# Patient Record
Sex: MALE | Race: WHITE | NOT HISPANIC OR LATINO
[De-identification: names, ages, dates, MRNs, and addresses within clinical notes are randomized per-mention and may not be internally consistent; named-entity substitution may affect disease eponyms.]

---

## 2021-01-11 PROBLEM — Z00.00 ENCOUNTER FOR PREVENTIVE HEALTH EXAMINATION: Status: ACTIVE | Noted: 2021-01-11

## 2021-01-25 ENCOUNTER — APPOINTMENT (OUTPATIENT)
Dept: NEUROLOGY | Facility: CLINIC | Age: 61
End: 2021-01-25
Payer: COMMERCIAL

## 2021-01-25 ENCOUNTER — INPATIENT (INPATIENT)
Facility: HOSPITAL | Age: 61
LOS: 2 days | Discharge: HOME | DRG: 101 | End: 2021-01-28
Attending: INTERNAL MEDICINE | Admitting: INTERNAL MEDICINE
Payer: COMMERCIAL

## 2021-01-25 VITALS
HEIGHT: 74 IN | SYSTOLIC BLOOD PRESSURE: 121 MMHG | TEMPERATURE: 100 F | RESPIRATION RATE: 20 BRPM | WEIGHT: 250 LBS | DIASTOLIC BLOOD PRESSURE: 72 MMHG | OXYGEN SATURATION: 97 % | HEART RATE: 138 BPM

## 2021-01-25 VITALS
TEMPERATURE: 96.8 F | DIASTOLIC BLOOD PRESSURE: 75 MMHG | HEART RATE: 137 BPM | BODY MASS INDEX: 32.08 KG/M2 | OXYGEN SATURATION: 98 % | WEIGHT: 250 LBS | SYSTOLIC BLOOD PRESSURE: 121 MMHG | HEIGHT: 74 IN

## 2021-01-25 VITALS
BODY MASS INDEX: 32.08 KG/M2 | TEMPERATURE: 97.3 F | WEIGHT: 250 LBS | DIASTOLIC BLOOD PRESSURE: 73 MMHG | RESPIRATION RATE: 20 BRPM | HEIGHT: 74 IN | SYSTOLIC BLOOD PRESSURE: 119 MMHG | HEART RATE: 138 BPM

## 2021-01-25 DIAGNOSIS — R56.9 UNSPECIFIED CONVULSIONS: ICD-10-CM

## 2021-01-25 DIAGNOSIS — G35 MULTIPLE SCLEROSIS: ICD-10-CM

## 2021-01-25 DIAGNOSIS — Z29.9 ENCOUNTER FOR PROPHYLACTIC MEASURES, UNSPECIFIED: ICD-10-CM

## 2021-01-25 DIAGNOSIS — R40.20 UNSPECIFIED COMA: ICD-10-CM

## 2021-01-25 DIAGNOSIS — Z86.39 PERSONAL HISTORY OF OTHER ENDOCRINE, NUTRITIONAL AND METABOLIC DISEASE: ICD-10-CM

## 2021-01-25 DIAGNOSIS — N17.9 ACUTE KIDNEY FAILURE, UNSPECIFIED: ICD-10-CM

## 2021-01-25 DIAGNOSIS — R65.10 SYSTEMIC INFLAMMATORY RESPONSE SYNDROME (SIRS) OF NON-INFECTIOUS ORIGIN WITHOUT ACUTE ORGAN DYSFUNCTION: ICD-10-CM

## 2021-01-25 DIAGNOSIS — R00.0 TACHYCARDIA, UNSPECIFIED: ICD-10-CM

## 2021-01-25 DIAGNOSIS — E87.6 HYPOKALEMIA: ICD-10-CM

## 2021-01-25 LAB
ALBUMIN SERPL ELPH-MCNC: 2.7 G/DL — LOW (ref 3.5–5)
ALBUMIN SERPL ELPH-MCNC: 3 G/DL — LOW (ref 3.5–5)
ALP SERPL-CCNC: 100 U/L — SIGNIFICANT CHANGE UP (ref 40–120)
ALP SERPL-CCNC: 119 U/L — SIGNIFICANT CHANGE UP (ref 40–120)
ALT FLD-CCNC: 19 U/L DA — SIGNIFICANT CHANGE UP (ref 10–60)
ALT FLD-CCNC: 22 U/L DA — SIGNIFICANT CHANGE UP (ref 10–60)
AMPHET UR-MCNC: NEGATIVE — SIGNIFICANT CHANGE UP
ANION GAP SERPL CALC-SCNC: 14 MMOL/L — SIGNIFICANT CHANGE UP (ref 5–17)
ANION GAP SERPL CALC-SCNC: 9 MMOL/L — SIGNIFICANT CHANGE UP (ref 5–17)
APTT BLD: 30.4 SEC — SIGNIFICANT CHANGE UP (ref 27.5–35.5)
AST SERPL-CCNC: 25 U/L — SIGNIFICANT CHANGE UP (ref 10–40)
AST SERPL-CCNC: 28 U/L — SIGNIFICANT CHANGE UP (ref 10–40)
BARBITURATES UR SCN-MCNC: NEGATIVE — SIGNIFICANT CHANGE UP
BASOPHILS # BLD AUTO: 0.06 K/UL — SIGNIFICANT CHANGE UP (ref 0–0.2)
BASOPHILS NFR BLD AUTO: 0.5 % — SIGNIFICANT CHANGE UP (ref 0–2)
BENZODIAZ UR-MCNC: POSITIVE
BILIRUB SERPL-MCNC: 1.6 MG/DL — HIGH (ref 0.2–1.2)
BILIRUB SERPL-MCNC: 1.7 MG/DL — HIGH (ref 0.2–1.2)
BUN SERPL-MCNC: 13 MG/DL — SIGNIFICANT CHANGE UP (ref 7–18)
BUN SERPL-MCNC: 14 MG/DL — SIGNIFICANT CHANGE UP (ref 7–18)
CALCIUM SERPL-MCNC: 6.7 MG/DL — LOW (ref 8.4–10.5)
CALCIUM SERPL-MCNC: 6.9 MG/DL — LOW (ref 8.4–10.5)
CHLORIDE SERPL-SCNC: 101 MMOL/L — SIGNIFICANT CHANGE UP (ref 96–108)
CHLORIDE SERPL-SCNC: 96 MMOL/L — SIGNIFICANT CHANGE UP (ref 96–108)
CHLORIDE UR-SCNC: 40 MMOL/L — SIGNIFICANT CHANGE UP
CHOLEST SERPL-MCNC: 111 MG/DL — SIGNIFICANT CHANGE UP
CO2 SERPL-SCNC: 26 MMOL/L — SIGNIFICANT CHANGE UP (ref 22–31)
CO2 SERPL-SCNC: 29 MMOL/L — SIGNIFICANT CHANGE UP (ref 22–31)
COCAINE METAB.OTHER UR-MCNC: NEGATIVE — SIGNIFICANT CHANGE UP
CREAT ?TM UR-MCNC: 412 MG/DL — SIGNIFICANT CHANGE UP
CREAT SERPL-MCNC: 1.53 MG/DL — HIGH (ref 0.5–1.3)
CREAT SERPL-MCNC: 1.67 MG/DL — HIGH (ref 0.5–1.3)
D DIMER BLD IA.RAPID-MCNC: 807 NG/ML DDU — HIGH
EOSINOPHIL # BLD AUTO: 0.04 K/UL — SIGNIFICANT CHANGE UP (ref 0–0.5)
EOSINOPHIL NFR BLD AUTO: 0.3 % — SIGNIFICANT CHANGE UP (ref 0–6)
GLUCOSE SERPL-MCNC: 112 MG/DL — HIGH (ref 70–99)
GLUCOSE SERPL-MCNC: 169 MG/DL — HIGH (ref 70–99)
HCT VFR BLD CALC: 35.7 % — LOW (ref 39–50)
HDLC SERPL-MCNC: 77 MG/DL — SIGNIFICANT CHANGE UP
HGB BLD-MCNC: 11.6 G/DL — LOW (ref 13–17)
IMM GRANULOCYTES NFR BLD AUTO: 0.6 % — SIGNIFICANT CHANGE UP (ref 0–1.5)
INR BLD: 1.21 RATIO — HIGH (ref 0.88–1.16)
IRON SATN MFR SERPL: 15 % — LOW (ref 20–55)
IRON SATN MFR SERPL: 29 UG/DL — LOW (ref 65–170)
LACTATE SERPL-SCNC: 2.9 MMOL/L — HIGH (ref 0.7–2)
LACTATE SERPL-SCNC: 3 MMOL/L — HIGH (ref 0.7–2)
LACTATE SERPL-SCNC: 3.5 MMOL/L — HIGH (ref 0.7–2)
LDH SERPL L TO P-CCNC: 165 U/L — SIGNIFICANT CHANGE UP (ref 120–225)
LIPID PNL WITH DIRECT LDL SERPL: 22 MG/DL — SIGNIFICANT CHANGE UP
LYMPHOCYTES # BLD AUTO: 1.05 K/UL — SIGNIFICANT CHANGE UP (ref 1–3.3)
LYMPHOCYTES # BLD AUTO: 8 % — LOW (ref 13–44)
MAGNESIUM SERPL-MCNC: 0.5 MG/DL — CRITICAL LOW (ref 1.6–2.6)
MAGNESIUM SERPL-MCNC: 0.5 MG/DL — CRITICAL LOW (ref 1.6–2.6)
MAGNESIUM SERPL-MCNC: 1.8 MG/DL — SIGNIFICANT CHANGE UP (ref 1.6–2.6)
MCHC RBC-ENTMCNC: 29.6 PG — SIGNIFICANT CHANGE UP (ref 27–34)
MCHC RBC-ENTMCNC: 32.5 GM/DL — SIGNIFICANT CHANGE UP (ref 32–36)
MCV RBC AUTO: 91.1 FL — SIGNIFICANT CHANGE UP (ref 80–100)
METHADONE UR-MCNC: NEGATIVE — SIGNIFICANT CHANGE UP
MONOCYTES # BLD AUTO: 0.48 K/UL — SIGNIFICANT CHANGE UP (ref 0–0.9)
MONOCYTES NFR BLD AUTO: 3.7 % — SIGNIFICANT CHANGE UP (ref 2–14)
NEUTROPHILS # BLD AUTO: 11.37 K/UL — HIGH (ref 1.8–7.4)
NEUTROPHILS NFR BLD AUTO: 86.9 % — HIGH (ref 43–77)
NON HDL CHOLESTEROL: 34 MG/DL — SIGNIFICANT CHANGE UP
NRBC # BLD: 0 /100 WBCS — SIGNIFICANT CHANGE UP (ref 0–0)
NT-PROBNP SERPL-SCNC: 80 PG/ML — SIGNIFICANT CHANGE UP (ref 0–125)
OPIATES UR-MCNC: NEGATIVE — SIGNIFICANT CHANGE UP
OSMOLALITY UR: 474 MOS/KG — SIGNIFICANT CHANGE UP (ref 50–1200)
PCP SPEC-MCNC: SIGNIFICANT CHANGE UP
PCP UR-MCNC: NEGATIVE — SIGNIFICANT CHANGE UP
PHOSPHATE SERPL-MCNC: 2.1 MG/DL — LOW (ref 2.5–4.5)
PHOSPHATE SERPL-MCNC: 2.1 MG/DL — LOW (ref 2.5–4.5)
PLATELET # BLD AUTO: 102 K/UL — LOW (ref 150–400)
POTASSIUM SERPL-MCNC: 3.1 MMOL/L — LOW (ref 3.5–5.3)
POTASSIUM SERPL-MCNC: 3.3 MMOL/L — LOW (ref 3.5–5.3)
POTASSIUM SERPL-SCNC: 3.1 MMOL/L — LOW (ref 3.5–5.3)
POTASSIUM SERPL-SCNC: 3.3 MMOL/L — LOW (ref 3.5–5.3)
PROT SERPL-MCNC: 6.1 G/DL — SIGNIFICANT CHANGE UP (ref 6–8.3)
PROT SERPL-MCNC: 6.9 G/DL — SIGNIFICANT CHANGE UP (ref 6–8.3)
PROTHROM AB SERPL-ACNC: 14.3 SEC — HIGH (ref 10.6–13.6)
RBC # BLD: 3.92 M/UL — LOW (ref 4.2–5.8)
RBC # FLD: 17.6 % — HIGH (ref 10.3–14.5)
SARS-COV-2 RNA SPEC QL NAA+PROBE: SIGNIFICANT CHANGE UP
SODIUM SERPL-SCNC: 136 MMOL/L — SIGNIFICANT CHANGE UP (ref 135–145)
SODIUM SERPL-SCNC: 139 MMOL/L — SIGNIFICANT CHANGE UP (ref 135–145)
SODIUM UR-SCNC: 33 MMOL/L — SIGNIFICANT CHANGE UP
T4 AB SER-ACNC: 9.7 UG/DL — SIGNIFICANT CHANGE UP (ref 4.6–12)
THC UR QL: NEGATIVE — SIGNIFICANT CHANGE UP
TIBC SERPL-MCNC: 199 UG/DL — LOW (ref 250–450)
TRIGL SERPL-MCNC: 59 MG/DL — SIGNIFICANT CHANGE UP
TROPONIN I SERPL-MCNC: <0.015 NG/ML — SIGNIFICANT CHANGE UP (ref 0–0.04)
TSH SERPL-MCNC: 2.92 UU/ML — SIGNIFICANT CHANGE UP (ref 0.34–4.82)
UIBC SERPL-MCNC: 170 UG/DL — SIGNIFICANT CHANGE UP (ref 110–370)
URATE SERPL-MCNC: 9 MG/DL — HIGH (ref 3.4–8.8)
WBC # BLD: 13.08 K/UL — HIGH (ref 3.8–10.5)
WBC # FLD AUTO: 13.08 K/UL — HIGH (ref 3.8–10.5)

## 2021-01-25 PROCEDURE — 82962 GLUCOSE BLOOD TEST: CPT | Mod: NC

## 2021-01-25 PROCEDURE — 71045 X-RAY EXAM CHEST 1 VIEW: CPT | Mod: 26

## 2021-01-25 PROCEDURE — 99285 EMERGENCY DEPT VISIT HI MDM: CPT

## 2021-01-25 PROCEDURE — 99072 ADDL SUPL MATRL&STAF TM PHE: CPT

## 2021-01-25 PROCEDURE — 99205 OFFICE O/P NEW HI 60 MIN: CPT

## 2021-01-25 PROCEDURE — 70486 CT MAXILLOFACIAL W/O DYE: CPT | Mod: 26

## 2021-01-25 PROCEDURE — 70450 CT HEAD/BRAIN W/O DYE: CPT | Mod: 26

## 2021-01-25 RX ORDER — SODIUM CHLORIDE 9 MG/ML
1000 INJECTION INTRAMUSCULAR; INTRAVENOUS; SUBCUTANEOUS
Refills: 0 | Status: DISCONTINUED | OUTPATIENT
Start: 2021-01-25 | End: 2021-01-28

## 2021-01-25 RX ORDER — MAGNESIUM SULFATE 500 MG/ML
2 VIAL (ML) INJECTION
Refills: 0 | Status: COMPLETED | OUTPATIENT
Start: 2021-01-25 | End: 2021-01-26

## 2021-01-25 RX ORDER — ACETAMINOPHEN 500 MG
650 TABLET ORAL EVERY 6 HOURS
Refills: 0 | Status: DISCONTINUED | OUTPATIENT
Start: 2021-01-25 | End: 2021-01-28

## 2021-01-25 RX ORDER — POTASSIUM PHOSPHATE, MONOBASIC POTASSIUM PHOSPHATE, DIBASIC 236; 224 MG/ML; MG/ML
30 INJECTION, SOLUTION INTRAVENOUS ONCE
Refills: 0 | Status: COMPLETED | OUTPATIENT
Start: 2021-01-25 | End: 2021-01-25

## 2021-01-25 RX ORDER — CALCIUM GLUCONATE 100 MG/ML
1 VIAL (ML) INTRAVENOUS ONCE
Refills: 0 | Status: COMPLETED | OUTPATIENT
Start: 2021-01-25 | End: 2021-01-25

## 2021-01-25 RX ORDER — SODIUM CHLORIDE 9 MG/ML
1000 INJECTION, SOLUTION INTRAVENOUS ONCE
Refills: 0 | Status: COMPLETED | OUTPATIENT
Start: 2021-01-25 | End: 2021-01-25

## 2021-01-25 RX ORDER — SIMVASTATIN 10 MG/1
10 TABLET, FILM COATED ORAL
Refills: 0 | Status: ACTIVE | COMMUNITY

## 2021-01-25 RX ORDER — SODIUM,POTASSIUM PHOSPHATES 278-250MG
1 POWDER IN PACKET (EA) ORAL ONCE
Refills: 0 | Status: COMPLETED | OUTPATIENT
Start: 2021-01-25 | End: 2021-01-25

## 2021-01-25 RX ORDER — CEFEPIME 1 G/1
2000 INJECTION, POWDER, FOR SOLUTION INTRAMUSCULAR; INTRAVENOUS EVERY 8 HOURS
Refills: 0 | Status: DISCONTINUED | OUTPATIENT
Start: 2021-01-25 | End: 2021-01-27

## 2021-01-25 RX ORDER — HEPARIN SODIUM 5000 [USP'U]/ML
5000 INJECTION INTRAVENOUS; SUBCUTANEOUS EVERY 12 HOURS
Refills: 0 | Status: DISCONTINUED | OUTPATIENT
Start: 2021-01-25 | End: 2021-01-28

## 2021-01-25 RX ORDER — LISINOPRIL 2.5 MG/1
2.5 TABLET ORAL
Refills: 0 | Status: ACTIVE | COMMUNITY

## 2021-01-25 RX ORDER — FENTANYL CITRATE 50 UG/ML
50 INJECTION INTRAVENOUS ONCE
Refills: 0 | Status: DISCONTINUED | OUTPATIENT
Start: 2021-01-25 | End: 2021-01-25

## 2021-01-25 RX ORDER — MAGNESIUM SULFATE 500 MG/ML
2 VIAL (ML) INJECTION ONCE
Refills: 0 | Status: COMPLETED | OUTPATIENT
Start: 2021-01-25 | End: 2021-01-25

## 2021-01-25 RX ORDER — ASPIRIN/CALCIUM CARB/MAGNESIUM 324 MG
162 TABLET ORAL DAILY
Refills: 0 | Status: DISCONTINUED | OUTPATIENT
Start: 2021-01-25 | End: 2021-01-28

## 2021-01-25 RX ORDER — HYDROMORPHONE HYDROCHLORIDE 2 MG/ML
0.5 INJECTION INTRAMUSCULAR; INTRAVENOUS; SUBCUTANEOUS EVERY 6 HOURS
Refills: 0 | Status: DISCONTINUED | OUTPATIENT
Start: 2021-01-25 | End: 2021-01-28

## 2021-01-25 RX ORDER — POTASSIUM CHLORIDE 20 MEQ
40 PACKET (EA) ORAL ONCE
Refills: 0 | Status: COMPLETED | OUTPATIENT
Start: 2021-01-25 | End: 2021-01-25

## 2021-01-25 RX ORDER — PIPERACILLIN AND TAZOBACTAM 4; .5 G/20ML; G/20ML
3.38 INJECTION, POWDER, LYOPHILIZED, FOR SOLUTION INTRAVENOUS ONCE
Refills: 0 | Status: COMPLETED | OUTPATIENT
Start: 2021-01-25 | End: 2021-01-25

## 2021-01-25 RX ADMIN — Medication 100 GRAM(S): at 20:09

## 2021-01-25 RX ADMIN — SODIUM CHLORIDE 1000 MILLILITER(S): 9 INJECTION, SOLUTION INTRAVENOUS at 13:30

## 2021-01-25 RX ADMIN — PIPERACILLIN AND TAZOBACTAM 200 GRAM(S): 4; .5 INJECTION, POWDER, LYOPHILIZED, FOR SOLUTION INTRAVENOUS at 14:07

## 2021-01-25 RX ADMIN — PIPERACILLIN AND TAZOBACTAM 3.38 GRAM(S): 4; .5 INJECTION, POWDER, LYOPHILIZED, FOR SOLUTION INTRAVENOUS at 14:37

## 2021-01-25 RX ADMIN — Medication 162 MILLIGRAM(S): at 13:51

## 2021-01-25 RX ADMIN — SODIUM CHLORIDE 1000 MILLILITER(S): 9 INJECTION, SOLUTION INTRAVENOUS at 14:58

## 2021-01-25 RX ADMIN — POTASSIUM PHOSPHATE, MONOBASIC POTASSIUM PHOSPHATE, DIBASIC 83.33 MILLIMOLE(S): 236; 224 INJECTION, SOLUTION INTRAVENOUS at 22:18

## 2021-01-25 RX ADMIN — Medication 1 TABLET(S): at 22:17

## 2021-01-25 RX ADMIN — SODIUM CHLORIDE 3000 MILLILITER(S): 9 INJECTION, SOLUTION INTRAVENOUS at 13:58

## 2021-01-25 RX ADMIN — SODIUM CHLORIDE 80 MILLILITER(S): 9 INJECTION INTRAMUSCULAR; INTRAVENOUS; SUBCUTANEOUS at 22:37

## 2021-01-25 RX ADMIN — HEPARIN SODIUM 5000 UNIT(S): 5000 INJECTION INTRAVENOUS; SUBCUTANEOUS at 22:36

## 2021-01-25 RX ADMIN — Medication 100 GRAM(S): at 18:38

## 2021-01-25 RX ADMIN — Medication 40 MILLIEQUIVALENT(S): at 18:37

## 2021-01-25 RX ADMIN — Medication 50 GRAM(S): at 22:17

## 2021-01-25 RX ADMIN — FENTANYL CITRATE 50 MICROGRAM(S): 50 INJECTION INTRAVENOUS at 13:57

## 2021-01-25 RX ADMIN — SODIUM CHLORIDE 1000 MILLILITER(S): 9 INJECTION, SOLUTION INTRAVENOUS at 14:30

## 2021-01-25 NOTE — PHYSICAL EXAM
[General Appearance - Alert] : alert [General Appearance - In No Acute Distress] : in no acute distress [Person] : oriented to person [Place] : oriented to place [Time] : oriented to time [Registration Intact] : recent registration memory intact [Concentration Intact] : normal concentrating ability [Visual Intact] : visual attention was ~T not ~L decreased [Naming Objects] : no difficulty naming common objects [Repeating Phrases] : no difficulty repeating a phrase [Fluency] : fluency intact [Comprehension] : comprehension intact [Vocabulary] : adequate range of vocabulary [Cranial Nerves Optic (II)] : visual acuity intact bilaterally,  visual fields full to confrontation, pupils equal round and reactive to light [Cranial Nerves Oculomotor (III)] : extraocular motion intact [Cranial Nerves Trigeminal (V)] : facial sensation intact symmetrically [Cranial Nerves Facial (VII)] : face symmetrical [Cranial Nerves Vestibulocochlear (VIII)] : hearing was intact bilaterally [Cranial Nerves Glossopharyngeal (IX)] : tongue and palate midline [Cranial Nerves Accessory (XI - Cranial And Spinal)] : head turning and shoulder shrug symmetric [Cranial Nerves Hypoglossal (XII)] : there was no tongue deviation with protrusion [Motor Tone] : muscle tone was normal in all four extremities [Motor Strength] : muscle strength was normal in all four extremities [Involuntary Movements] : no involuntary movements were seen [Sensation Tactile Decrease] : light touch was intact [Abnormal Walk] : normal gait [Balance] : balance was intact [1+] : Ankle jerk left 1+ [Full Pulse] : the pedal pulses are present [Coordination - Dysmetria Impaired Finger-to-Nose Bilateral] : not present [Coordination - Dysmetria Impaired Heel-to-Shin Bilateral] : not present [Plantar Reflex Right Only] : normal on the right [Plantar Reflex Left Only] : normal on the left [FreeTextEntry5] : right exotropia, fundi not visualized

## 2021-01-25 NOTE — H&P ADULT - PROBLEM SELECTOR PLAN 5
Pt p/w wbc 13, hr 138, rr 20, lactate 3.5, no apparent source meets 3/4 criterion of sirs  covid neg  s/p zosyn in ed  will give prophylactic antibioitcs till bcx and ucx are back   ceftriaxone and azithro  f/u procal

## 2021-01-25 NOTE — H&P ADULT - PROBLEM SELECTOR PLAN 7
RISK                                                          Points  [] Previous VTE                                           3  [] Thrombophilia                                        2  [] Lower limb paralysis                              2   [] Current Cancer                                       2   [x] Immobilization > 24 hrs                        1  [] ICU/CCU stay > 24 hours                       1  [x] Age > 60                                                   1    sc lovenox RISK                                                          Points  [] Previous VTE                                           3  [] Thrombophilia                                        2  [] Lower limb paralysis                              2   [] Current Cancer                                       2   [x] Immobilization > 24 hrs                        1  [] ICU/CCU stay > 24 hours                       1  [x] Age > 60                                                   1    sc heparin pt last drink last night 1/24  usually drinks 3-4 bear per day   will start ciwa protocol  thiamin iv  ativan prn

## 2021-01-25 NOTE — ED ADULT TRIAGE NOTE - CHIEF COMPLAINT QUOTE
PT SENT BY PMD FOR LOC, FOLLOWED BY GENERALIZED SHAKING AND INC PT SENT BY PMD FOR LOC, FOLLOWED BY GENERALIZED SHAKING AND INCONTINENCE

## 2021-01-25 NOTE — H&P ADULT - HISTORY OF PRESENT ILLNESS
60 year old male with PMHx of multiple sclerosis and no significant PSHx presents to the ED with complaints of severe post nasal drip over the past few days. Patient reports that his symptoms are caused by a decrease in po intake, which he states has caused him to become very dehydrated. Patient endorses that he has been taking Benadryl 24 grams q4h at home in order to attempt to dry up the post nasal drip. Patient additionally complains of some mild dizziness. Patient reports that he has not had an MRI done in the past twenty-five years related to his MS and that he is not currently on medication for his condition, thus prompting [facility]. Patient endorses that just prior to arrival today he experienced loss of consciousness while in the examination room of his doctor's examination room. Patient states that during the episode he was generally shaking, with his eyes open and rolling back with a right-sided gaze deviation. Patient reports that the entire episode lasted for approximately two to three minutes, after which he returned to his baseline spontaneously with some postictal confusion, per Dr. Rizo. Patient was then escorted to the ED via EMS immediately. In the ED, patient denies any associated chest pain, shortness of breath, sweatiness, vomiting, and any other symptoms 60 year old male from home with PMHx of multiple sclerosis and no significant PSHx presents to the ED  after experiencing an episode of seizure at his neurologist office. Patient reports that he has not had an MRI done in the past twenty-five years related to his MS and that he is not currently on medication for his condition, thus prompting him to go see his neurologist at [place] Dr. Rizo. Patient endorses that just prior to arrival today he experienced loss of consciousness while in the examination room of his doctor's examination room. Patient states that during the episode he was generally shaking, with his eyes open and rolling back with a right-sided gaze deviation. Patient reports that the entire episode lasted for approximately two to three minutes, after which he returned to his baseline spontaneously with some postictal confusion as per dr office. Patient was then escorted to the ED via EMS immediately. In the ED, patient denies any associated chest pain, shortness of breath, sweatiness, vomiting, and any other symptoms. Pt also has complaints of severe post nasal drip over the past few days. Patient reports that his symptoms are caused by a decrease in po intake, which he states has caused him to become very dehydrated. Patient endorses that he has been taking Benadryl 24 grams q4h at home in order to attempt to dry up the post nasal drip. Patient additionally complains of some mild dizziness.  60 year old male from home with PMHx of multiple sclerosis and no significant PSHx presents to the ED  after experiencing an episode of seizure at his neurologist office. Patient reports that he has not had an MRI done in the past twenty-five years related to his MS and that he is not currently on medication for his condition, thus prompting him to go see his neurologist at [place] Dr. Rizo. Patient endorses that just prior to arrival today he experienced loss of consciousness while in the examination room of his doctor's examination room. Patient states that during the episode he was generally shaking, with his eyes open and rolling back with a right-sided gaze deviation. Patient reports that the entire episode lasted for approximately two to three minutes, after which he returned to his baseline spontaneously with some postictal confusion as per dr office. Patient was then escorted to the ED via EMS immediately. In the ED, patient denies any associated chest pain, shortness of breath, sweatiness, vomiting, and any other symptoms. Pt also has complaints of severe post nasal drip over the past few days. Patient reports that his symptoms are caused by a decrease in po intake, which he states has caused him to become very dehydrated. Patient endorses that he has been taking Benadryl 24 grams q4h at home in order to attempt to dry up the post nasal drip. Patient additionally complains of some mild dizziness. Pt endorsed diarrhoea for last 3 days and has been coughing up and vomiting mucous that is dripping from back of his throat.

## 2021-01-25 NOTE — ED PROVIDER NOTE - PROGRESS NOTE DETAILS
Patient developing back pain. Given EKG abnormalities from prior, this is concerning for a posterior MI. Will repeat EKG, give fluid bolus, and reassess. May need to transfer to cardiology. ekg, sxs improving. arturo ferrer - does want eeg/mr / wants pt to be seen by neuro here. will admit for further care.

## 2021-01-25 NOTE — HISTORY OF PRESENT ILLNESS
[FreeTextEntry1] : The patient is here for dizziness but prior to being seen the patient has an episode in  the exam room of LOC noted  by the wife followed by generalized tonic clonic seizure, followed with eye open and rolled back and right gaze deviation.  The event lasted for about 2-3 minutes and the patient had post ictal confusion with some aggression.  He recovered to baseline.  There was no tongue bite, foaming, bowel or  bladder incontinence. The patient had not been eating and has been vomiting at home, FS was in the 150;s in the office.  BP and temperature were normal but the patient's pulse was elevated to the 130's.

## 2021-01-25 NOTE — ED PROVIDER NOTE - OBJECTIVE STATEMENT
60 year old male with PMHx of multiple sclerosis and no significant PSHx presents to the ED with complaints of severe post nasal drip over the past few days. Patient reports that his symptoms are caused by a decrease in po intake, which he states has caused him to become very dehydrated. Patient endorses that he has been taking Benadryl 24 grams q4h at home in order to attempt to dry up the post nasal drip. Patient additionally complains of some mild dizziness. Patient reports that he has not had an MRI done in the past twenty-five years related to his MS and that he is not currently on medication for his condition, thus prompting him to [facility]. Patient endorses that just prior to arrival today he experienced loss of consciousness while in the examination room of his doctor's examination room. Patient states that during the episode he was generally shaking, with his eyes open and rolling back with a right-sided gaze deviation. Patient reports that the entire episode lasted for approximately two to three minutes, after which he returned to his baseline spontaneously with some postictal confusion, per Dr. Rizo. Patient was then escorted to the ED via EMS immediately. In the ED, patient denies any associated chest pain, shortness of breath, sweatiness, vomiting, and any other symptoms.   Allergies: Keflex (itchiness)

## 2021-01-25 NOTE — ED ADULT NURSE NOTE - NSIMPLEMENTINTERV_GEN_ALL_ED
Implemented All Fall with Harm Risk Interventions:  [place] to call system. Call bell, personal items and telephone within reach. Instruct patient to call for assistance. Room bathroom lighting operational. Non-slip footwear when patient is off stretcher. Physically safe environment: no spills, clutter or unnecessary equipment. Stretcher in lowest position, wheels locked, appropriate side rails in place. Provide visual cue, wrist band, yellow gown, etc. Monitor gait and stability. Monitor for mental status changes and reorient to person, place, and time. Review medications for side effects contributing to fall risk. Reinforce activity limits and safety measures with patient and family. Provide visual clues: red socks.

## 2021-01-25 NOTE — H&P ADULT - ASSESSMENT
60 year old male from home with PMHx of multiple sclerosis and no significant PSHx presents to the ED  after experiencing an episode of seizure at his neurologist office.   patient ADMITTED FOR workup of newonset seizure.

## 2021-01-25 NOTE — REVIEW OF SYSTEMS
[As Noted in HPI] : as noted in HPI [Fever] : no fever [Anxiety] : no anxiety [Eyesight Problems] : no eyesight problems [Loss Of Hearing] : no hearing loss [Chest Pain] : no chest pain [Cough] : no cough [Incontinence] : no incontinence [Vomiting] : no vomiting [Joint Pain] : no joint pain [Itching] : no itching [Muscle Weakness] : no muscle weakness [Easy Bleeding] : no tendency for easy bleeding

## 2021-01-25 NOTE — ED PROVIDER NOTE - CLINICAL SUMMARY MEDICAL DECISION MAKING FREE TEXT BOX
Seizure vs. syncope. Clinically symptoms are likely related to dehydration, however low grade temperature noted. Will obtain CT scan of the head and max/face to evaluate for an abscess related to an upper airway infection, as well as a CT head to evaluate for a bleed. Given that Dr. Rizo thought that patient had a seizure, I will discuss with her to see if patient should be admitted for a EEG, however I suspect that this is not likely as symptoms are likely related to dehydration. Will give fluid bolus and reassess.    EKG with concerning findings that may be rate related or ischemic.

## 2021-01-25 NOTE — H&P ADULT - PROBLEM SELECTOR PLAN 6
Pt has hx of MS   on no active treatment aggresively replaced   f/u mag serum levels  f/u urine levels'

## 2021-01-25 NOTE — H&P ADULT - PROBLEM SELECTOR PLAN 1
- p/w episode of witnessed seizure atdr office (Tonic clonic episode), with fall on the floor  - Followed by some post-ictal confusion   - No focal weakness, CN 2-12 intact  - pt has hx of MS  - Afebrile, mild WBC elevation, negative UA, and CT head unremarkable  - MILD electrolytes IMBALANCE and renal functions SHOW SOME ALICE  - Lactate 3.5  - f/u BAL , Salicylates and Tylenol  , Utox   - EKG NSR  - CT head : unremarkable CT study of the brain. No acute abnormality suggested.  - Aspiration precaution/Seizure precaution/Fall precautions   - f/u Orthostatics   - f/u Vitamin B12 & Folate  - f/u  ammonia level  s/p phentanyl in ed  - f/u PT  -f/u eeg  ** Neurology consulted Dr. MANNING - p/w episode of witnessed seizure atdr office (Tonic clonic episode), with fall on the floor  - Followed by some post-ictal confusion   - No focal weakness, CN 2-12 intact  - pt has hx of MS  - Afebrile, mild WBC elevation, negative UA, and CT head unremarkable  - MILD electrolytes IMBALANCE and renal functions SHOW SOME ALICE  - Lactate 3.5  - f/u BAL , Salicylates and Tylenol  , Utox   - EKG NSR  - CT head : unremarkable CT study of the brain. No acute abnormality suggested.  - Aspiration precaution/Seizure precaution/Fall precautions   - f/u Orthostatics   - f/u Vitamin B12 & Folate  - f/u  ammonia level  s/p phentanyl in ed  - f/u PT  -f/u eeg  -f/u ct angio for r/o pe  ** Neurology consulted Dr. MANNING

## 2021-01-25 NOTE — H&P ADULT - NSHPPHYSICALEXAM_GEN_ALL_CORE
PHYSICAL EXAM:  GENERAL: NAD, speaks in full sentences, no signs of respiratory distress  HEAD:  Atraumatic, Normocephalic  EYES: EOMI, PERRLA, conjunctiva and sclera clear  NECK: Supple, No JVD  CHEST/LUNG: Clear to auscultation bilaterally; No wheeze; No crackles; No accessory muscles used  HEART: Regular rate and rhythm; No murmurs;   ABDOMEN: Soft, Nontender, Nondistended; Bowel sounds present; No guarding  EXTREMITIES:  2+ Peripheral Pulses, No cyanosis or edema  PSYCH: AAOx3  NEUROLOGY: non-focal  SKIN: No rashes or lesions PHYSICAL EXAM:  GENERAL: NAD, speaks in full sentences, no signs of respiratory distress  HEAD:  Atraumatic, Normocephalic  EYES: EOMI, PERRLA, conjunctiva and sclera clear  NECK: Supple, No JVD  CHEST/LUNG: Clear to auscultation bilaterally; No wheeze; No crackles; No accessory muscles used  HEART: s1,s2 ; No murmurs;   ABDOMEN: Soft, Nontender, Nondistended; Bowel sounds present; No guarding  EXTREMITIES:  2+ Peripheral Pulses, No cyanosis or edema  PSYCH: AAOx3  NEUROLOGY: no-focal deficit, no weakness, numbness ,tingling, cranial nerves intact  SKIN: No rashes or lesions

## 2021-01-25 NOTE — H&P ADULT - PROBLEM SELECTOR PLAN 3
Pt has an episode of loss of consciousness with dizziness  f/u orthostatics  f/u eeg   f/u echo  plan as above Pt has an episode of loss of consciousness with dizziness  f/u orthostatics  f/u eeg   f/u ct angio to r/o pe  f/u echo  plan as above

## 2021-01-25 NOTE — ASSESSMENT
[FreeTextEntry1] : The patient has history of Multiple Sclerosis as per the wife and was here in the office for dizziness when he had first time seizure, with was generalized and then had focal features.  The patient also had brief post ictal confusion and aggression but recovered to baseline.  His HR continues to be elevated.  GIven his recent not eating hypoglycemia is possible etiology of seizure but FS is normal.  Other metabolic abnormalities such as hyponatremia are also possible and expect mostly generalized seizure with then.  The patient did however have focal features to the seizure and should consider brain tumor, stroke or MS plaque as the possible etiology of the seizure.  EMS was called and the patient was send to the ED for evaluation of the seizures, including labs, infectious work up, MRI brain and EEG; as well as for evaluation of the tachycardia.    Case was discussed with the ED physician at [facility].

## 2021-01-26 DIAGNOSIS — E83.42 HYPOMAGNESEMIA: ICD-10-CM

## 2021-01-26 DIAGNOSIS — F10.10 ALCOHOL ABUSE, UNCOMPLICATED: ICD-10-CM

## 2021-01-26 LAB
ALBUMIN SERPL ELPH-MCNC: 2.6 G/DL — LOW (ref 3.5–5)
ALP SERPL-CCNC: 98 U/L — SIGNIFICANT CHANGE UP (ref 40–120)
ALT FLD-CCNC: 18 U/L DA — SIGNIFICANT CHANGE UP (ref 10–60)
AMPHET UR-MCNC: NEGATIVE — SIGNIFICANT CHANGE UP
ANION GAP SERPL CALC-SCNC: 9 MMOL/L — SIGNIFICANT CHANGE UP (ref 5–17)
AST SERPL-CCNC: 22 U/L — SIGNIFICANT CHANGE UP (ref 10–40)
BARBITURATES UR SCN-MCNC: NEGATIVE — SIGNIFICANT CHANGE UP
BENZODIAZ UR-MCNC: POSITIVE
BILIRUB SERPL-MCNC: 1.4 MG/DL — HIGH (ref 0.2–1.2)
BUN SERPL-MCNC: 12 MG/DL — SIGNIFICANT CHANGE UP (ref 7–18)
CALCIUM SERPL-MCNC: 6.8 MG/DL — LOW (ref 8.4–10.5)
CHLORIDE SERPL-SCNC: 105 MMOL/L — SIGNIFICANT CHANGE UP (ref 96–108)
CO2 SERPL-SCNC: 27 MMOL/L — SIGNIFICANT CHANGE UP (ref 22–31)
COCAINE METAB.OTHER UR-MCNC: NEGATIVE — SIGNIFICANT CHANGE UP
CREAT SERPL-MCNC: 1.28 MG/DL — SIGNIFICANT CHANGE UP (ref 0.5–1.3)
GLUCOSE BLDC GLUCOMTR-MCNC: 156
GLUCOSE SERPL-MCNC: 104 MG/DL — HIGH (ref 70–99)
LACTATE SERPL-SCNC: 1.6 MMOL/L — SIGNIFICANT CHANGE UP (ref 0.7–2)
MAGNESIUM SERPL-MCNC: 1.9 MG/DL — SIGNIFICANT CHANGE UP (ref 1.6–2.6)
METHADONE UR-MCNC: NEGATIVE — SIGNIFICANT CHANGE UP
OPIATES UR-MCNC: POSITIVE
PCP SPEC-MCNC: SIGNIFICANT CHANGE UP
PCP UR-MCNC: NEGATIVE — SIGNIFICANT CHANGE UP
PHOSPHATE SERPL-MCNC: 5.2 MG/DL — HIGH (ref 2.5–4.5)
POTASSIUM SERPL-MCNC: 4.1 MMOL/L — SIGNIFICANT CHANGE UP (ref 3.5–5.3)
POTASSIUM SERPL-SCNC: 4.1 MMOL/L — SIGNIFICANT CHANGE UP (ref 3.5–5.3)
PROT SERPL-MCNC: 5.9 G/DL — LOW (ref 6–8.3)
SODIUM SERPL-SCNC: 141 MMOL/L — SIGNIFICANT CHANGE UP (ref 135–145)
THC UR QL: NEGATIVE — SIGNIFICANT CHANGE UP
URATE UR-MCNC: 80.9 MG/DL — SIGNIFICANT CHANGE UP

## 2021-01-26 PROCEDURE — 71275 CT ANGIOGRAPHY CHEST: CPT | Mod: 26

## 2021-01-26 PROCEDURE — 95819 EEG AWAKE AND ASLEEP: CPT | Mod: 26

## 2021-01-26 PROCEDURE — 99255 IP/OBS CONSLTJ NEW/EST HI 80: CPT

## 2021-01-26 RX ORDER — CALCITRIOL 0.5 UG/1
0.25 CAPSULE ORAL DAILY
Refills: 0 | Status: DISCONTINUED | OUTPATIENT
Start: 2021-01-26 | End: 2021-01-27

## 2021-01-26 RX ORDER — LISINOPRIL 2.5 MG/1
0 TABLET ORAL
Qty: 0 | Refills: 0 | DISCHARGE

## 2021-01-26 RX ORDER — THIAMINE MONONITRATE (VIT B1) 100 MG
100 TABLET ORAL ONCE
Refills: 0 | Status: DISCONTINUED | OUTPATIENT
Start: 2021-01-26 | End: 2021-01-28

## 2021-01-26 RX ORDER — FOLIC ACID 0.8 MG
1 TABLET ORAL DAILY
Refills: 0 | Status: DISCONTINUED | OUTPATIENT
Start: 2021-01-26 | End: 2021-01-28

## 2021-01-26 RX ORDER — THIAMINE MONONITRATE (VIT B1) 100 MG
500 TABLET ORAL EVERY 8 HOURS
Refills: 0 | Status: DISCONTINUED | OUTPATIENT
Start: 2021-01-26 | End: 2021-01-28

## 2021-01-26 RX ORDER — HYDROMORPHONE HYDROCHLORIDE 2 MG/ML
0.5 INJECTION INTRAMUSCULAR; INTRAVENOUS; SUBCUTANEOUS ONCE
Refills: 0 | Status: DISCONTINUED | OUTPATIENT
Start: 2021-01-26 | End: 2021-01-26

## 2021-01-26 RX ORDER — ONDANSETRON 8 MG/1
8 TABLET, FILM COATED ORAL
Refills: 0 | Status: DISCONTINUED | OUTPATIENT
Start: 2021-01-26 | End: 2021-01-28

## 2021-01-26 RX ADMIN — HEPARIN SODIUM 5000 UNIT(S): 5000 INJECTION INTRAVENOUS; SUBCUTANEOUS at 06:39

## 2021-01-26 RX ADMIN — ONDANSETRON 8 MILLIGRAM(S): 8 TABLET, FILM COATED ORAL at 17:33

## 2021-01-26 RX ADMIN — CEFEPIME 100 MILLIGRAM(S): 1 INJECTION, POWDER, FOR SOLUTION INTRAMUSCULAR; INTRAVENOUS at 14:07

## 2021-01-26 RX ADMIN — CEFEPIME 100 MILLIGRAM(S): 1 INJECTION, POWDER, FOR SOLUTION INTRAMUSCULAR; INTRAVENOUS at 01:08

## 2021-01-26 RX ADMIN — ONDANSETRON 8 MILLIGRAM(S): 8 TABLET, FILM COATED ORAL at 06:39

## 2021-01-26 RX ADMIN — CEFEPIME 100 MILLIGRAM(S): 1 INJECTION, POWDER, FOR SOLUTION INTRAMUSCULAR; INTRAVENOUS at 09:26

## 2021-01-26 RX ADMIN — HEPARIN SODIUM 5000 UNIT(S): 5000 INJECTION INTRAVENOUS; SUBCUTANEOUS at 17:33

## 2021-01-26 RX ADMIN — Medication 1 MILLIGRAM(S): at 12:37

## 2021-01-26 RX ADMIN — CEFEPIME 100 MILLIGRAM(S): 1 INJECTION, POWDER, FOR SOLUTION INTRAMUSCULAR; INTRAVENOUS at 23:30

## 2021-01-26 RX ADMIN — Medication 105 MILLIGRAM(S): at 21:02

## 2021-01-26 RX ADMIN — HYDROMORPHONE HYDROCHLORIDE 0.5 MILLIGRAM(S): 2 INJECTION INTRAMUSCULAR; INTRAVENOUS; SUBCUTANEOUS at 01:05

## 2021-01-26 RX ADMIN — Medication 50 GRAM(S): at 00:47

## 2021-01-26 RX ADMIN — CALCITRIOL 0.25 MICROGRAM(S): 0.5 CAPSULE ORAL at 12:37

## 2021-01-26 RX ADMIN — Medication 650 MILLIGRAM(S): at 12:37

## 2021-01-26 RX ADMIN — Medication 105 MILLIGRAM(S): at 06:39

## 2021-01-26 RX ADMIN — HYDROMORPHONE HYDROCHLORIDE 0.5 MILLIGRAM(S): 2 INJECTION INTRAMUSCULAR; INTRAVENOUS; SUBCUTANEOUS at 04:03

## 2021-01-26 RX ADMIN — Medication 105 MILLIGRAM(S): at 14:07

## 2021-01-26 RX ADMIN — Medication 162 MILLIGRAM(S): at 12:37

## 2021-01-26 NOTE — PROGRESS NOTE ADULT - SUBJECTIVE AND OBJECTIVE BOX
Patient is a 60y old  Male who presents with a chief complaint of seizure (25 Jan 2021 17:08)(?)/syncopy      INTERVAL HPI/OVERNIGHT EVENTS:  T(C): 36.8 (01-26-21 @ 07:50), Max: 37.5 (01-25-21 @ 12:15)  HR: 102 (01-26-21 @ 07:50) (102 - 138)  BP: 119/69 (01-26-21 @ 07:50) (100/66 - 121/72)  RR: 20 (01-26-21 @ 07:50) (18 - 20)  SpO2: 97% (01-26-21 @ 07:50) (97% - 100%)  Wt(kg): --    LABS:                        11.6   13.08 )-----------( 102      ( 25 Jan 2021 13:10 )             35.7     01-26    141  |  105  |  12  ----------------------------<  104<H>  4.1   |  27  |  1.28    Ca    6.8<L>      26 Jan 2021 07:06  Phos  5.2     01-26  Mg     1.9     01-26    TPro  5.9<L>  /  Alb  2.6<L>  /  TBili  1.4<H>  /  DBili  x   /  AST  22  /  ALT  18  /  AlkPhos  98  01-26    PT/INR - ( 25 Jan 2021 13:10 )   PT: 14.3 sec;   INR: 1.21 ratio         PTT - ( 25 Jan 2021 13:10 )  PTT:30.4 sec    CAPILLARY BLOOD GLUCOSE      POCT Blood Glucose.: 175 mg/dL (25 Jan 2021 12:31)        RADIOLOGY & ADDITIONAL TESTS:    Consultant(s) Notes Reviewed:  [x ] YES  [ ] NO    PHYSICAL EXAM:  GENERAL: well built, well nourished  HEAD:  Atraumatic, Normocephalic  EYES: EOMI, PERRLA, conjunctiva and sclera clear  ENT: No tonsillar erythema, exudates, or enlargement; Moist mucous membranes, Good dentition, No lesions  NECK: Supple, No JVD, Normal thyroid, no enlarged nodes  NERVOUS SYSTEM:  Alert & Oriented X3, Good concentration; Motor Strength 5/5 B/L upper and lower extremities; DTRs 2+ intact and symmetric, sensory intact  CHEST/LUNG: B/L good air entry; No rales, rhonchi, or wheezing  HEART: S1S2 mild tachy  ABDOMEN: Soft, Nontender, Nondistended; Bowel sounds present  EXTREMITIES:  2+ Peripheral Pulses, No clubbing, cyanosis, or edema  LYMPH: No lymphadenopathy noted  SKIN: No rashes or lesions    Care Discussed with Consultants/Other Providers [ x] YES  [ ] NO

## 2021-01-26 NOTE — EEG REPORT - NS EEG TEXT BOX
CAMERON CARTWRIGHT [id number] 60y (1960)M  Admitting MD: Dr. Gail Ny    Study Date: 01-26-21    --------------------------------------------------------------------------------------------------  History:  CC/ HPI Patient is a 60y old  Male who presents with a chief complaint of seizure (26 Jan 2021 11:06)    aspirin  chewable 162 milliGRAM(s) Oral daily  calcitriol   Capsule 0.25 MICROGram(s) Oral daily  cefepime   IVPB 2000 milliGRAM(s) IV Intermittent every 8 hours  folic acid 1 milliGRAM(s) Oral daily  heparin   Injectable 5000 Unit(s) SubCutaneous every 12 hours  ondansetron Injectable 8 milliGRAM(s) IV Push two times a day  sodium chloride 0.9%. 1000 milliLiter(s) IV Continuous <Continuous>  thiamine Injectable 100 milliGRAM(s) IntraMuscular once  thiamine IVPB 500 milliGRAM(s) IV Intermittent every 8 hours    --------------------------------------------------------------------------------------------------  Study Interpretation:    [[[Abbreviation Key:  PDR=alpha rhythm/posterior dominant rhythm. A-P=anterior posterior gradient.  Amplitude: ‘very low’:<20; ‘low’:20-50; ‘medium’:; ‘high’:>200uV.  Persistence for periodic/rhythmic patterns (% of epoch) ‘rare’:<1%; ‘occasional’:1-10%; ‘frequent’:10-50%; ‘abundant’:50-90%; ‘continuous’:>90%.  Persistence for sporadic discharges: ‘rare’:<1/hr; ‘occasional’:1/min-1/hr; ‘frequent’:>1/min; ‘abundant’:>1/10 sec.  GRDA=generalized rhythmic delta activity, LRDA=lateralized rhythmic delta activity, TIRDA=temporal intermittent rhythmic delta activity, FIRDA=frontal intermittent rhythmic activity. LPD=PLED=lateralized periodic discharges, GPD=generalized periodic discharges, BiPDs=BiPLEDs=bilateral independent periodic epileptiform discharges, SIRPID=stimulus induced rhythmic, periodic, or ictal appearing discharges.  Modifiers: +F=with fast component, +S=with spike component, +R=with rhythmic component.  S-B=burst suppression pattern.  Max=maximal. N1-drowsy, N2-stage II sleep, N3-slow wave sleep.  HV=hyperventilation, PS=photic stimulation]]]    FINDINGS:  The background was continuous, spontaneously variable and reactive.  During wakefulness, the posteriorly dominant rhythm consisted of symmetric, well modulated 10 Hz activity, with an amplitude to 40 uV, that attenuated to eye opening.  Low amplitude diffuse beta was noted     Background Slowing:  Generalized slowing: none was present.  Focal slowing: none was present.    Sleep Background:  Stage II sleep transients were not recorded.    Epileptiform Activity:   No epileptiform discharges were present.    Events:  No clinical events were recorded.  No seizures were recorded.    Activation Procedures:   -Hyperventilation was not performed.    -Photic stimulation was performed and did not elicit any abnormalities.      Artifacts:  Intermittent myogenic and movement artifacts were noted.    ECG:  The heart rate on single channel ECG at baseline was predominantly near BPM = 80-90  -----------------------------------------------------------------------------------------------------    EEG Classification / Summary:  Normal EEG study, awake   Low amplitude diffuse beta was noted   -----------------------------------------------------------------------------------------------------    Clinical Impression:  There were no epileptiform abnormalities recorded.    Persistent generalized fast frequency activity may be a normal variant, but is typically a result of a sedative medication effect.    -------------------------------------------------------------------------------------------------------  [facility] EEG Reading Room Ph#: [phone]  Epilepsy Answering Service after 5PM and before 8:30AM: Ph#: [phone]    Jose Juan Rodriguez M.D.   of Neurology, [facility]	   CAMERON CARTWRIGHT [id number] 60y (1960)M  Admitting MD: Dr. Gail Ny    Study Date: 01-26-21    --------------------------------------------------------------------------------------------------  History:  CC/ HPI Patient is a 60y old  Male who presents with a chief complaint of seizure (26 Jan 2021 11:06)    aspirin  chewable 162 milliGRAM(s) Oral daily  calcitriol   Capsule 0.25 MICROGram(s) Oral daily  cefepime   IVPB 2000 milliGRAM(s) IV Intermittent every 8 hours  folic acid 1 milliGRAM(s) Oral daily  heparin   Injectable 5000 Unit(s) SubCutaneous every 12 hours  ondansetron Injectable 8 milliGRAM(s) IV Push two times a day  sodium chloride 0.9%. 1000 milliLiter(s) IV Continuous <Continuous>  thiamine Injectable 100 milliGRAM(s) IntraMuscular once  thiamine IVPB 500 milliGRAM(s) IV Intermittent every 8 hours    --------------------------------------------------------------------------------------------------  Study Interpretation:    [[[Abbreviation Key:  PDR=alpha rhythm/posterior dominant rhythm. A-P=anterior posterior gradient.  Amplitude: ‘very low’:<20; ‘low’:20-50; ‘medium’:; ‘high’:>200uV.  Persistence for periodic/rhythmic patterns (% of epoch) ‘rare’:<1%; ‘occasional’:1-10%; ‘frequent’:10-50%; ‘abundant’:50-90%; ‘continuous’:>90%.  Persistence for sporadic discharges: ‘rare’:<1/hr; ‘occasional’:1/min-1/hr; ‘frequent’:>1/min; ‘abundant’:>1/10 sec.  GRDA=generalized rhythmic delta activity, LRDA=lateralized rhythmic delta activity, TIRDA=temporal intermittent rhythmic delta activity, FIRDA=frontal intermittent rhythmic activity. LPD=PLED=lateralized periodic discharges, GPD=generalized periodic discharges, BiPDs=BiPLEDs=bilateral independent periodic epileptiform discharges, SIRPID=stimulus induced rhythmic, periodic, or ictal appearing discharges.  Modifiers: +F=with fast component, +S=with spike component, +R=with rhythmic component.  S-B=burst suppression pattern.  Max=maximal. N1-drowsy, N2-stage II sleep, N3-slow wave sleep.  HV=hyperventilation, PS=photic stimulation]]]    FINDINGS:  The background was continuous, spontaneously variable and reactive.  During wakefulness, the posteriorly dominant rhythm consisted of symmetric, well modulated 10 Hz activity, with an amplitude to 40 uV, that attenuated to eye opening.  Low amplitude diffuse beta was noted     Background Slowing:  Generalized slowing: none was present.  Focal slowing: none was present.    Sleep Background:  Drowsiness with brief bursts of central sharp theta.  Stage II sleep transients were not recorded.    Epileptiform Activity:   No epileptiform discharges were present.    Events:  No clinical events were recorded.  No seizures were recorded.    Activation Procedures:   -Hyperventilation was not performed.    -Photic stimulation was performed and did not elicit any abnormalities.      Artifacts:  Intermittent myogenic and movement artifacts were noted.    ECG:  The heart rate on single channel ECG at baseline was predominantly near BPM = 80-90  -----------------------------------------------------------------------------------------------------    EEG Classification / Summary:  Normal EEG study, awake and briefly drowsy  Low amplitude diffuse beta was noted   -----------------------------------------------------------------------------------------------------    Clinical Impression:  There were no epileptiform abnormalities recorded.  This does not exclude a diagnosis of a seizure disorder.  Persistent generalized fast frequency activity may be a normal variant, but is typically a result of a sedative medication effect.    -------------------------------------------------------------------------------------------------------  [facility] EEG Reading Room Ph#: [phone]  Epilepsy Answering Service after 5PM and before 8:30AM: Ph#: [phone]    Jose Juan Rodriguez M.D.   of Neurology, [facility] [place]

## 2021-01-26 NOTE — PHYSICAL THERAPY INITIAL EVALUATION ADULT - ACTIVE RANGE OF MOTION EXAMINATION, REHAB EVAL
amirah. upper extremity Active ROM was WNL (within normal limits)/bilateral lower extremity Active ROM was WNL (within normal limits)

## 2021-01-26 NOTE — CONSULT NOTE ADULT - SUBJECTIVE AND OBJECTIVE BOX
++++++++++++++++++++++NOTE NOT COMPLETED++++++++++++++++++++++++++++++++++++    Patient is a 60y old  Male who presents with a chief complaint of seizure (25 Jan 2021 17:08)      HPI:  60 year old male from home with PMHx of multiple sclerosis and no significant PSHx presents to the ED  after experiencing an episode of seizure at his neurologist office. Patient reports that he has not had an MRI done in the past twenty-five years related to his MS and that he is not currently on medication for his condition, thus prompting him to go see his neurologist at [place] Dr. Rizo. Patient endorses that just prior to arrival today he experienced loss of consciousness while in the examination room of his doctor's examination room. Patient states that during the episode he was generally shaking, with his eyes open and rolling back with a right-sided gaze deviation. Patient reports that the entire episode lasted for approximately two to three minutes, after which he returned to his baseline spontaneously with some postictal confusion as per dr office. Patient was then escorted to the ED via EMS immediately. In the ED, patient denies any associated chest pain, shortness of breath, sweatiness, vomiting, and any other symptoms. Pt also has complaints of severe post nasal drip over the past few days. Patient reports that his symptoms are caused by a decrease in po intake, which he states has caused him to become very dehydrated. Patient endorses that he has been taking Benadryl 24 grams q4h at home in order to attempt to dry up the post nasal drip. Patient additionally complains of some mild dizziness. Pt endorsed diarrhoea for last 3 days and has been coughing up and vomiting mucous that is dripping from back of his throat. (25 Jan 2021 17:08)         The seizure is described as  Seizure onset at  The frequency of seizure is  The seizure is brought up by  The patient has been previously on     History of head trauma/ concussion:  History of meningitis:  History of febrile seizures:  Family history of seizures:    Neurological Review of Systems:  No difficulty with language.  No vision loss or double vision.  No dizziness, vertigo or new hearing loss.  No difficulty with speech or swallowing.  No focal weakness.  No focal sensory changes.  No numbness or tingling in the bilateral lower extremities.  No difficulty with balance.  No difficulty with ambulation.        MEDICATIONS  (STANDING):  aspirin  chewable 162 milliGRAM(s) Oral daily  calcitriol   Capsule 0.25 MICROGram(s) Oral daily  cefepime   IVPB 2000 milliGRAM(s) IV Intermittent every 8 hours  folic acid 1 milliGRAM(s) Oral daily  heparin   Injectable 5000 Unit(s) SubCutaneous every 12 hours  ondansetron Injectable 8 milliGRAM(s) IV Push two times a day  sodium chloride 0.9%. 1000 milliLiter(s) (80 mL/Hr) IV Continuous <Continuous>  thiamine Injectable 100 milliGRAM(s) IntraMuscular once  thiamine IVPB 500 milliGRAM(s) IV Intermittent every 8 hours    MEDICATIONS  (PRN):  acetaminophen   Tablet .. 650 milliGRAM(s) Oral every 6 hours PRN Temp greater or equal to 38C (100.4F), Mild Pain (1 - 3)  HYDROmorphone  Injectable 0.5 milliGRAM(s) IV Push every 6 hours PRN Severe Pain (7 - 10)  LORazepam   Injectable 2 milliGRAM(s) IV Push every 4 hours PRN CIWA-Ar score 8 or greater    Allergies    Keflex (Other)    Intolerances      PAST MEDICAL & SURGICAL HISTORY:  MS (multiple sclerosis)    No significant past surgical history      FAMILY HISTORY:    SOCIAL HISTORY: non smoker/ former smoker/ active smoker    Review of Systems:  Constitutional: No generalized weakness. No fevers or chills.                    Eyes, Ears, Mouth, Throat: No vision loss   Respiratory: No shortness of breath or cough.                                Cardiovascular: No chest pain or palpitations  Gastrointestinal: No nausea or vomiting.                                         Genitourinary: No urinary incontinence or burning on urination.  Musculoskeletal: No joint pain.                                                           Dermatologic: No rash.  Neurological: as per HPI                                                                      Psychiatric: No behavioral problems.  Endocrine: No known hypoglycemia.               Hematologic/Lymphatic: No easy bleeding.    O:  Vital Signs Last 24 Hrs  T(C): 36.8 (26 Jan 2021 07:50), Max: 37.5 (25 Jan 2021 12:15)  T(F): 98.2 (26 Jan 2021 07:50), Max: 99.5 (25 Jan 2021 12:15)  HR: 102 (26 Jan 2021 07:50) (102 - 138)  BP: 119/69 (26 Jan 2021 07:50) (100/66 - 121/72)  BP(mean): --  RR: 20 (26 Jan 2021 07:50) (18 - 20)  SpO2: 97% (26 Jan 2021 07:50) (97% - 100%)    General Exam:   General appearance: No acute distress                 Cardiovascular: Pedal dorsalis pulses intact bilaterally    Mental Status: Oriented to self, date and place.  Attention intact.  No dysarthria, aphasia or neglect.  Knowledge intact.  Registration intact.  Short and long term memory grossly intact.      Cranial Nerves: CN I - not tested.  PERRL, EOMI, VFF, no nystagmus or diplopia.  No APD.  Fundi not visualized.  CN V1-3 intact to light touch and pinprick.  No facial asymmetry.  Hearing intact to finger rub bilaterally.  Tongue, uvula and palate midline.  Sternocleidomastoid and Trapezius intact bilaterally.    Motor:   Tone: normal.                  Strength intact throughout  No pronator drift bilaterally                      No dysmetria on finger-nose-finger or heel-shin-heel  No truncal ataxia.  No resting, postural or action tremor.  No myoclonus.    Sensation: intact to light touch, pinprick, vibration and proprioception    Deep Tendon Reflexes: 1+ bilateral biceps, triceps, brachioradialis, knee and ankle  Toes flexor bilaterally    Gait: normal and stable.  Romberg (-).    Other:     LABS:                        11.6   13.08 )-----------( 102      ( 25 Jan 2021 13:10 )             35.7     01-26    141  |  105  |  12  ----------------------------<  104<H>  4.1   |  27  |  1.28    Ca    6.8<L>      26 Jan 2021 07:06  Phos  5.2     01-26  Mg     1.9     01-26    TPro  5.9<L>  /  Alb  2.6<L>  /  TBili  1.4<H>  /  DBili  x   /  AST  22  /  ALT  18  /  AlkPhos  98  01-26    PT/INR - ( 25 Jan 2021 13:10 )   PT: 14.3 sec;   INR: 1.21 ratio         PTT - ( 25 Jan 2021 13:10 )  PTT:30.4 sec      RADIOLOGY & ADDITIONAL STUDIES:    EEG:     ++++++++++++++++++++++NOTE NOT COMPLETED++++++++++++++++++++++++++++++++++++    Patient is a 60y old  Male who presents with a chief complaint of seizure (25 Jan 2021 17:08)      HPI:  60 year old male from home with PMHx of multiple sclerosis and no significant PSHx presents to the ED  after experiencing an episode of seizure at his neurologist office. Patient reports that he has not had an MRI done in the past twenty-five years related to his MS and that he is not currently on medication for his condition, thus prompting him to go see his neurologist at [place] Dr. Rizo. Patient endorses that just prior to arrival today he experienced loss of consciousness while in the examination room of his doctor's examination room. Patient states that during the episode he was generally shaking, with his eyes open and rolling back with a right-sided gaze deviation. Patient reports that the entire episode lasted for approximately two to three minutes, after which he returned to his baseline spontaneously with some postictal confusion as per dr office. Patient was then escorted to the ED via EMS immediately. In the ED, patient denies any associated chest pain, shortness of breath, sweatiness, vomiting, and any other symptoms. Pt also has complaints of severe post nasal drip over the past few days. Patient reports that his symptoms are caused by a decrease in po intake, which he states has caused him to become very dehydrated. Patient endorses that he has been taking Benadryl 24 grams q4h at home in order to attempt to dry up the post nasal drip. Patient additionally complains of some mild dizziness. Pt endorsed diarrhoea for last 3 days and has been coughing up and vomiting mucous that is dripping from back of his throat. (25 Jan 2021 17:08)    Reports vomiting and diarrhea 5x's/d x 4-5days.  States that he was going to sit at doctors office and "I could see myself when I was falling."  Next memory was EMS standing over him.  Denies tongue bite, loss of bowels or urine.       The seizure is described as tonic clonic  Seizure onset - this is the first   The frequency of seizure Denies prior occurrence   The seizure is brought up by unknown   The patient has been previously on No medication     History of head trauma/ concussion: Denies   History of meningitis: Denies   History of febrile seizures: Denies   Family history of seizures: Denies     Neurological Review of Systems:  No difficulty with language.  No vision loss or double vision.  No dizziness, vertigo or new hearing loss.  No difficulty with speech or swallowing.  No focal weakness.  No focal sensory changes.  No numbness or tingling in the bilateral lower extremities.  No difficulty with balance.  No difficulty with ambulation.        MEDICATIONS  (STANDING):  aspirin  chewable 162 milliGRAM(s) Oral daily  calcitriol   Capsule 0.25 MICROGram(s) Oral daily  cefepime   IVPB 2000 milliGRAM(s) IV Intermittent every 8 hours  folic acid 1 milliGRAM(s) Oral daily  heparin   Injectable 5000 Unit(s) SubCutaneous every 12 hours  ondansetron Injectable 8 milliGRAM(s) IV Push two times a day  sodium chloride 0.9%. 1000 milliLiter(s) (80 mL/Hr) IV Continuous <Continuous>  thiamine Injectable 100 milliGRAM(s) IntraMuscular once  thiamine IVPB 500 milliGRAM(s) IV Intermittent every 8 hours    MEDICATIONS  (PRN):  acetaminophen   Tablet .. 650 milliGRAM(s) Oral every 6 hours PRN Temp greater or equal to 38C (100.4F), Mild Pain (1 - 3)  HYDROmorphone  Injectable 0.5 milliGRAM(s) IV Push every 6 hours PRN Severe Pain (7 - 10)  LORazepam   Injectable 2 milliGRAM(s) IV Push every 4 hours PRN CIWA-Ar score 8 or greater    Allergies    Keflex (Other)    Intolerances      PAST MEDICAL & SURGICAL HISTORY:  MS (multiple sclerosis)    No significant past surgical history      FAMILY HISTORY:    SOCIAL HISTORY: non smoker    Review of Systems:  Constitutional: No generalized weakness. No fevers or chills                 Eyes, Ears, Mouth, Throat: No vision loss   Respiratory: No shortness of breath or cough                              Cardiovascular: No chest pain or palpitations  Gastrointestinal: (+) Nausea & vomiting                                        Genitourinary: No urinary incontinence or burning on urination  Musculoskeletal: No joint pain                                                       Dermatologic: No rash  Neurological: as per HPI                                                                      Psychiatric: No behavioral problems  Endocrine: No known hypoglycemia               Hematologic/Lymphatic: No easy bleeding    O:  Vital Signs Last 24 Hrs  T(C): 36.8 (26 Jan 2021 07:50), Max: 37.5 (25 Jan 2021 12:15)  T(F): 98.2 (26 Jan 2021 07:50), Max: 99.5 (25 Jan 2021 12:15)  HR: 102 (26 Jan 2021 07:50) (102 - 138)  BP: 119/69 (26 Jan 2021 07:50) (100/66 - 121/72)  RR: 20 (26 Jan 2021 07:50) (18 - 20)  SpO2: 97% (26 Jan 2021 07:50) (97% - 100%)    General Exam:   General appearance: No acute distress                 Cardiovascular: Pedal dorsalis pulses intact bilaterally    Mental Status: Oriented to self, date and place.  Attention intact.  No dysarthria, aphasia or neglect.  Knowledge intact.  Registration intact.  Short and long term memory grossly intact.      Cranial Nerves: CN I - not tested.  PERRL, EOMI, VFF, no nystagmus or diplopia.  No APD.  Fundi not visualized.  CN V1-3 intact to light touch and pinprick.  No facial asymmetry.  Hearing intact to finger rub bilaterally.  Tongue, uvula and palate midline.  Sternocleidomastoid and Trapezius intact bilaterally.    Motor:   Tone: normal.                  Strength intact throughout  No pronator drift bilaterally                      No dysmetria on finger-nose-finger or heel-shin-heel  No truncal ataxia.  No resting, postural or action tremor.  No myoclonus.    Sensation: intact to light touch, pinprick, vibration and proprioception    Deep Tendon Reflexes: 1+ bilateral biceps, triceps, brachioradialis, knee and ankle  Toes flexor bilaterally    Gait: normal and stable.  Romberg (-).    Other:     LABS:                        11.6   13.08 )-----------( 102      ( 25 Jan 2021 13:10 )             35.7     01-26    141  |  105  |  12  ----------------------------<  104<H>  4.1   |  27  |  1.28    Ca    6.8<L>      26 Jan 2021 07:06  Phos  5.2     01-26  Mg     1.9     01-26    TPro  5.9<L>  /  Alb  2.6<L>  /  TBili  1.4<H>  /  DBili  x   /  AST  22  /  ALT  18  /  AlkPhos  98  01-26    PT/INR - ( 25 Jan 2021 13:10 )   PT: 14.3 sec;   INR: 1.21 ratio         PTT - ( 25 Jan 2021 13:10 )  PTT:30.4 sec      RADIOLOGY & ADDITIONAL STUDIES:    EEG:     ++++++++++++++++++++++NOTE NOT COMPLETED++++++++++++++++++++++++++++++++++++    Patient is a 60y old  Male who presents with a chief complaint of seizure (25 Jan 2021 17:08)      HPI:  60 year old male from home with PMHx of multiple sclerosis and no significant PSHx presents to the ED  after experiencing an episode of seizure at his neurologist office. Patient reports that he has not had an MRI done in the past twenty-five years related to his MS and that he is not currently on medication for his condition, thus prompting him to go see his neurologist at [place] Dr. Rizo. Patient endorses that just prior to arrival today he experienced loss of consciousness while in the examination room of his doctor's examination room. Patient states that during the episode he was generally shaking, with his eyes open and rolling back with a right-sided gaze deviation. Patient reports that the entire episode lasted for approximately two to three minutes, after which he returned to his baseline spontaneously with some postictal confusion as per dr office. Patient was then escorted to the ED via EMS immediately. In the ED, patient denies any associated chest pain, shortness of breath, sweatiness, vomiting, and any other symptoms. Pt also has complaints of severe post nasal drip over the past few days. Patient reports that his symptoms are caused by a decrease in po intake, which he states has caused him to become very dehydrated. Patient endorses that he has been taking Benadryl 24 grams q4h at home in order to attempt to dry up the post nasal drip. Patient additionally complains of some mild dizziness. Pt endorsed diarrhoea for last 3 days and has been coughing up and vomiting mucous that is dripping from back of his throat. (25 Jan 2021 17:08)    Reports vomiting and diarrhea 5x's/d x 4-5days.  States that he was going to sit at doctors office and "I could see myself when I was falling."  Next memory was EMS standing over him.  Denies tongue bite, loss of bowels or urine.       The seizure is described as tonic clonic  Seizure onset - this is the first   The frequency of seizure Denies prior occurrence   The seizure is brought up by unknown   The patient has been previously on No medication     History of head trauma/ concussion: Denies   History of meningitis: Denies   History of febrile seizures: Denies   Family history of seizures: Denies     Neurological Review of Systems:  No difficulty with language.  No vision loss or double vision.  No dizziness, vertigo or new hearing loss.  No difficulty with speech or swallowing.  No focal weakness.  No focal sensory changes.  No numbness or tingling in the bilateral lower extremities.  No difficulty with balance.  No difficulty with ambulation.        MEDICATIONS  (STANDING):  aspirin  chewable 162 milliGRAM(s) Oral daily  calcitriol   Capsule 0.25 MICROGram(s) Oral daily  cefepime   IVPB 2000 milliGRAM(s) IV Intermittent every 8 hours  folic acid 1 milliGRAM(s) Oral daily  heparin   Injectable 5000 Unit(s) SubCutaneous every 12 hours  ondansetron Injectable 8 milliGRAM(s) IV Push two times a day  sodium chloride 0.9%. 1000 milliLiter(s) (80 mL/Hr) IV Continuous <Continuous>  thiamine Injectable 100 milliGRAM(s) IntraMuscular once  thiamine IVPB 500 milliGRAM(s) IV Intermittent every 8 hours    MEDICATIONS  (PRN):  acetaminophen   Tablet .. 650 milliGRAM(s) Oral every 6 hours PRN Temp greater or equal to 38C (100.4F), Mild Pain (1 - 3)  HYDROmorphone  Injectable 0.5 milliGRAM(s) IV Push every 6 hours PRN Severe Pain (7 - 10)  LORazepam   Injectable 2 milliGRAM(s) IV Push every 4 hours PRN CIWA-Ar score 8 or greater    Allergies    Keflex (Other)    Intolerances      PAST MEDICAL & SURGICAL HISTORY:  MS (multiple sclerosis)    No significant past surgical history      FAMILY HISTORY:    SOCIAL HISTORY: non smoker    Review of Systems:  Constitutional: No generalized weakness. No fevers or chills                 Eyes, Ears, Mouth, Throat: No vision loss   Respiratory: No shortness of breath or cough                              Cardiovascular: No chest pain or palpitations  Gastrointestinal: (+) Nausea & vomiting                                        Genitourinary: No urinary incontinence or burning on urination  Musculoskeletal: No joint pain                                                       Dermatologic: No rash  Neurological: as per HPI                                                                      Psychiatric: No behavioral problems  Endocrine: No known hypoglycemia               Hematologic/Lymphatic: No easy bleeding    O:  Vital Signs Last 24 Hrs  T(C): 36.8 (26 Jan 2021 07:50), Max: 37.5 (25 Jan 2021 12:15)  T(F): 98.2 (26 Jan 2021 07:50), Max: 99.5 (25 Jan 2021 12:15)  HR: 102 (26 Jan 2021 07:50) (102 - 138)  BP: 119/69 (26 Jan 2021 07:50) (100/66 - 121/72)  RR: 20 (26 Jan 2021 07:50) (18 - 20)  SpO2: 97% (26 Jan 2021 07:50) (97% - 100%)    General Exam:   General appearance: No acute distress                 Cardiovascular: Pedal dorsalis pulses intact bilaterally    Mental Status: Oriented to self, date and place.  Attention intact.  No dysarthria, aphasia or neglect.  Knowledge intact.  Registration intact.  Short and long term memory grossly intact.      Cranial Nerves: CN I - not tested.  PERRL, EOMI, VFF, no nystagmus or diplopia.  No APD.  Fundi not visualized.  CN V1-3 intact to light touch and pinprick.  No facial asymmetry.  Hearing intact to finger rub bilaterally.  Tongue, uvula and palate midline.  Sternocleidomastoid and Trapezius intact bilaterally.    Motor:   Tone: Normal                  Strength intact throughout  No pronator drift bilaterally                      No dysmetria on finger-nose-finger or heel-shin-heel  No truncal ataxia.  No resting, postural or action tremor.  No myoclonus.    Sensation: Intact to light touch    Deep Tendon Reflexes: 1+ bilateral biceps, triceps, brachioradialis, knee and ankle  Left Babinski    Gait: normal and stable.  Romberg (-).    Other:     LABS:                        11.6   13.08 )-----------( 102      ( 25 Jan 2021 13:10 )             35.7     01-26    141  |  105  |  12  ----------------------------<  104<H>  4.1   |  27  |  1.28    Ca    6.8<L>      26 Jan 2021 07:06  Phos  5.2     01-26  Mg     1.9     01-26    TPro  5.9<L>  /  Alb  2.6<L>  /  TBili  1.4<H>  /  DBili  x   /  AST  22  /  ALT  18  /  AlkPhos  98  01-26    PT/INR - ( 25 Jan 2021 13:10 )   PT: 14.3 sec;   INR: 1.21 ratio         PTT - ( 25 Jan 2021 13:10 )  PTT:30.4 sec      RADIOLOGY & ADDITIONAL STUDIES:    EEG:  EEG Classification / Summary:  Normal EEG study, awake and briefly drowsy  Low amplitude diffuse beta was noted   -----------------------------------------------------------------------------------------------------    Clinical Impression:  There were no epileptiform abnormalities recorded.  This does not exclude a diagnosis of a seizure disorder.  Persistent generalized fast frequency activity may be a normal variant, but is typically a result of a sedative medication effect.    -------------------------------------------------------------------------------------------------------  [facility] EEG Reading Room Ph#: [phone]  Epilepsy Answering Service after 5PM and before 8:30AM: Ph#: [phone]    Jose Juan Rodriguez M.D.   of Neurology, [facility]	        Electronic Signatures:  Jose Juan Rodriguez)  (Signed 26-Jan-2021 11:16)  	Authored: TECH INFORMATION, EEG REPORT      Last Updated: 26-Jan-2021 11:16 by Jose Juan Rodriguez)       ++++++++++++++++++++++NOTE NOT COMPLETED++++++++++++++++++++++++++++++++++++    Patient is a 60y old  Male who presents with a chief complaint of seizure (25 Jan 2021 17:08)      HPI:  60 year old male from home with PMHx of multiple sclerosis and no significant PSHx presents to the ED  after experiencing an episode of seizure at his neurologist office. Patient reports that he has not had an MRI done in the past twenty-five years related to his MS and that he is not currently on medication for his condition, thus prompting him to go see his neurologist at [place] Dr. Rizo. Patient endorses that just prior to arrival today he experienced loss of consciousness while in the examination room of his doctor's examination room. Patient states that during the episode he was generally shaking, with his eyes open and rolling back with a right-sided gaze deviation. Patient reports that the entire episode lasted for approximately two to three minutes, after which he returned to his baseline spontaneously with some postictal confusion as per dr office. Patient was then escorted to the ED via EMS immediately. In the ED, patient denies any associated chest pain, shortness of breath, sweatiness, vomiting, and any other symptoms. Pt also has complaints of severe post nasal drip over the past few days. Patient reports that his symptoms are caused by a decrease in po intake, which he states has caused him to become very dehydrated. Patient endorses that he has been taking Benadryl 24 grams q4h at home in order to attempt to dry up the post nasal drip. Patient additionally complains of some mild dizziness. Pt endorsed diarrhoea for last 3 days and has been coughing up and vomiting mucous that is dripping from back of his throat. (25 Jan 2021 17:08)    Reports vomiting and diarrhea 5x's/d x 4-5days.  States that he was going to sit at doctors office and "I could see myself when I was falling."  Next memory was EMS standing over him.  Denies tongue bite, loss of bowels or urine.       The seizure is described as tonic clonic  Seizure onset - this is the first   The frequency of seizure Denies prior occurrence   The seizure is brought up by unknown   The patient has been previously on No medication     History of head trauma/ concussion: Denies   History of meningitis: Denies   History of febrile seizures: Denies   Family history of seizures: Denies     Neurological Review of Systems:  No difficulty with language.  No vision loss or double vision.  No dizziness, vertigo or new hearing loss.  No difficulty with speech or swallowing.  No focal weakness.  No focal sensory changes.  No numbness or tingling in the bilateral lower extremities.  No difficulty with balance.  No difficulty with ambulation.        MEDICATIONS  (STANDING):  aspirin  chewable 162 milliGRAM(s) Oral daily  calcitriol   Capsule 0.25 MICROGram(s) Oral daily  cefepime   IVPB 2000 milliGRAM(s) IV Intermittent every 8 hours  folic acid 1 milliGRAM(s) Oral daily  heparin   Injectable 5000 Unit(s) SubCutaneous every 12 hours  ondansetron Injectable 8 milliGRAM(s) IV Push two times a day  sodium chloride 0.9%. 1000 milliLiter(s) (80 mL/Hr) IV Continuous <Continuous>  thiamine Injectable 100 milliGRAM(s) IntraMuscular once  thiamine IVPB 500 milliGRAM(s) IV Intermittent every 8 hours    MEDICATIONS  (PRN):  acetaminophen   Tablet .. 650 milliGRAM(s) Oral every 6 hours PRN Temp greater or equal to 38C (100.4F), Mild Pain (1 - 3)  HYDROmorphone  Injectable 0.5 milliGRAM(s) IV Push every 6 hours PRN Severe Pain (7 - 10)  LORazepam   Injectable 2 milliGRAM(s) IV Push every 4 hours PRN CIWA-Ar score 8 or greater    Allergies    Keflex (Other)    Intolerances      PAST MEDICAL & SURGICAL HISTORY:  MS (multiple sclerosis)    No significant past surgical history      FAMILY HISTORY:    SOCIAL HISTORY: non smoker    Review of Systems:  Constitutional: No generalized weakness. No fevers or chills                 Eyes, Ears, Mouth, Throat: No vision loss   Respiratory: No shortness of breath or cough                              Cardiovascular: No chest pain or palpitations  Gastrointestinal: (+) Nausea & vomiting                                        Genitourinary: No urinary incontinence or burning on urination  Musculoskeletal: No joint pain                                                       Dermatologic: No rash  Neurological: as per HPI                                                                      Psychiatric: No behavioral problems  Endocrine: No known hypoglycemia               Hematologic/Lymphatic: No easy bleeding    O:  Vital Signs Last 24 Hrs  T(C): 36.8 (26 Jan 2021 07:50), Max: 37.5 (25 Jan 2021 12:15)  T(F): 98.2 (26 Jan 2021 07:50), Max: 99.5 (25 Jan 2021 12:15)  HR: 102 (26 Jan 2021 07:50) (102 - 138)  BP: 119/69 (26 Jan 2021 07:50) (100/66 - 121/72)  RR: 20 (26 Jan 2021 07:50) (18 - 20)  SpO2: 97% (26 Jan 2021 07:50) (97% - 100%)    General Exam:   General appearance: No acute distress                 Cardiovascular: Pedal dorsalis pulses intact bilaterally    Mental Status: Oriented to self, date and place.  Attention intact.  No dysarthria, aphasia or neglect.  Knowledge intact.  Registration intact.  Short and long term memory grossly intact.      Cranial Nerves: CN I - not tested.  PERRL, EOMI, VFF, no nystagmus or diplopia.  No APD.  Fundi not visualized.  CN V1-3 intact to light touch and pinprick.  No facial asymmetry.  Hearing intact to finger rub bilaterally.  Tongue, uvula and palate midline.  Sternocleidomastoid and Trapezius intact bilaterally.    Motor:   Tone: Normal                  Strength intact throughout  No pronator drift bilaterally                      No dysmetria on finger-nose-finger or heel-shin-heel  No truncal ataxia.  No resting, postural or action tremor.  No myoclonus.    Sensation: Intact to light touch    Deep Tendon Reflexes: 1+ bilateral biceps, triceps, brachioradialis, knee and ankle  Left Babinski    Gait: normal and stable.  Romberg (-).    Other:     LABS:                        11.6   13.08 )-----------( 102      ( 25 Jan 2021 13:10 )             35.7     01-26    141  |  105  |  12  ----------------------------<  104<H>  4.1   |  27  |  1.28    Ca    6.8<L>      26 Jan 2021 07:06  Phos  5.2     01-26  Mg     1.9     01-26    TPro  5.9<L>  /  Alb  2.6<L>  /  TBili  1.4<H>  /  DBili  x   /  AST  22  /  ALT  18  /  AlkPhos  98  01-26    PT/INR - ( 25 Jan 2021 13:10 )   PT: 14.3 sec;   INR: 1.21 ratio         PTT - ( 25 Jan 2021 13:10 )  PTT:30.4 sec      RADIOLOGY & ADDITIONAL STUDIES:    EEG:  EEG Classification / Summary:  Normal EEG study, awake and briefly drowsy  Low amplitude diffuse beta was noted   -----------------------------------------------------------------------------------------------------    Clinical Impression:  There were no epileptiform abnormalities recorded.  This does not exclude a diagnosis of a seizure disorder.  Persistent generalized fast frequency activity may be a normal variant, but is typically a result of a sedative medication effect.    -------------------------------------------------------------------------------------------------------  [facility] EEG Reading Room Ph#: [phone]  Epilepsy Answering Service after 5PM and before 8:30AM: Ph#: [phone]    Jose Juan Rodriguez M.D.   of Neurology, [facility]	        Electronic Signatures:  Jose Juan Rodriguez)  (Signed 26-Jan-2021 11:16)  	Authored: TECH INFORMATION, EEG REPORT      Last Updated: 26-Jan-2021 11:16 by Jose Juan Rodriguez)    < from: CT Head No Cont (01.25.21 @ 16:32) >    EXAM:  CT MAXILLOFACIAL                          EXAM:  CT BRAIN                            PROCEDURE DATE:  01/25/2021          INTERPRETATION:  INDICATIONS:  Evaluate for abscess. MS, sz, has had sinus infection    CT BRAIN:    TECHNIQUE:  Multiple contiguous axial images were obtained from the skull base to the vertex without the use of intravenous contrast.    COMPARISON EXAMINATION: None available at this time.    FINDINGS:  Ventricles and sulci: Parenchymal volume loss is present which iscommensurate with patient age.  Intra-axial: There are hemispheric white matter areas of low attenuation which are nonspecific but likely related to sequelae of microvascular disease.  No intracranial mass, acute hemorrhage, or significant midline shift is present.  Extra-axial: Bilateral small suspected subdural hygromas measuring 6.3 mm in the anterior left side and 6.7 mm on the right side.  Visualized sinuses: No air-fluid levels are identified. Mild scattered soft tissue in the ethmoid air cells.  Visualized mastoids:  Clear.  Calvarium: Unremarkable.  Miscellaneous:  None.    Impression: See below    ======================================================================================    MAXILLOFACIAL CT:    TECHNIQUE: This examinationconsists of axial 1.3 mm sections from the frontal sinuses through the maxilla.  The study was supplemented with coronal and sagittal reformatted images.  Dedicated 3-D images were made using dedicated software and viewed on a dedicated 3-D workstation in multiple planes.    Intravenous contrast was not administered.    FINDINGS:    Evaluation of the osseous structures demonstrates no evidence of fracture.    Mild scattered soft tissue in some of the bilateral ethmoid air cells.    The nasopharynx is normal in appearance.    The parotid glands are normal and symmetric in appearance. Accessory parotid gland on the left side.    The  space is normal bilaterally.    Parapharyngeal space is normal bilaterally.    The tongue base is normal in appearance. Streak artifact from dental amalgam.    The epiglottis is normal in thickness and appearance.    [place] tonsils are normal in appearance.    The submandibular glands are normal and symmetric in appearance bilaterally.    The platysma is normal in thickness.    Sternocleidomastoid muscles are normal and symmetric in appearance bilaterally where visualized.    Anterior strap muscles are unremarkable where visualized.    Carotid space is normal bilaterally where visualized. Aberrant course of the right internal carotid artery.    No suspicious adenopathy.    Bilateral calcifications in the posterior globes and orbits.      IMPRESSIONS:    Head CT: No CT evidence of acute intracranial hemorrhage.  Small bilateral subdural hygromas.    Maxillofacial CT: Examination of the facial bones is within normal limits.  Mild scattered inflammation in the sinuses. No abscess.              MILAGRO FLORES MD, Attending Radiologist  This document has been electronically signed. Jan 25 2021  4:50PM    < end of copied text >   Patient is a 60y old  Male who presents with a chief complaint of seizure (25 Jan 2021 17:08)      HPI:  60 year old male from home with PMHx of multiple sclerosis and no significant PSHx presents to the ED  after experiencing an episode of seizure at his neurologist office. Patient reports that he has not had an MRI done in the past twenty-five years related to his MS and that he is not currently on medication for his condition, thus prompting him to go see his neurologist at [place] Dr. Rizo. Patient endorses that just prior to arrival today he experienced loss of consciousness while in the examination room of his doctor's examination room. Patient states that during the episode he was generally shaking, with his eyes open and rolling back with a right-sided gaze deviation. Patient reports that the entire episode lasted for approximately two to three minutes, after which he returned to his baseline spontaneously with some postictal confusion as per dr office. Patient was then escorted to the ED via EMS immediately. In the ED, patient denies any associated chest pain, shortness of breath, sweatiness, vomiting, and any other symptoms. Pt also has complaints of severe post nasal drip over the past few days. Patient reports that his symptoms are caused by a decrease in po intake, which he states has caused him to become very dehydrated. Patient endorses that he has been taking Benadryl 24 grams q4h at home in order to attempt to dry up the post nasal drip. Patient additionally complains of some mild dizziness. Pt endorsed diarrhoea for last 3 days and has been coughing up and vomiting mucous that is dripping from back of his throat. (25 Jan 2021 17:08)    Reports vomiting and diarrhea 5x's/d x 4-5days.  States that he was going to sit at doctors office and "I could see myself when I was falling."  Next memory was EMS standing over him.  Denies tongue bite, loss of bowels or urine.       The seizure is described as tonic clonic  Seizure onset - this is the first   The frequency of seizure Denies prior occurrence   The seizure is brought up by unknown   The patient has been previously on No medication     History of head trauma/ concussion: Denies   History of meningitis: Denies   History of febrile seizures: Denies   Family history of seizures: Denies     Neurological Review of Systems:  No difficulty with language.  No vision loss or double vision.  No dizziness, vertigo or new hearing loss.  No difficulty with speech or swallowing.  No focal weakness.  No focal sensory changes.  No numbness or tingling in the bilateral lower extremities.  No difficulty with balance.  No difficulty with ambulation.        MEDICATIONS  (STANDING):  aspirin  chewable 162 milliGRAM(s) Oral daily  calcitriol   Capsule 0.25 MICROGram(s) Oral daily  cefepime   IVPB 2000 milliGRAM(s) IV Intermittent every 8 hours  folic acid 1 milliGRAM(s) Oral daily  heparin   Injectable 5000 Unit(s) SubCutaneous every 12 hours  ondansetron Injectable 8 milliGRAM(s) IV Push two times a day  sodium chloride 0.9%. 1000 milliLiter(s) (80 mL/Hr) IV Continuous <Continuous>  thiamine Injectable 100 milliGRAM(s) IntraMuscular once  thiamine IVPB 500 milliGRAM(s) IV Intermittent every 8 hours    MEDICATIONS  (PRN):  acetaminophen   Tablet .. 650 milliGRAM(s) Oral every 6 hours PRN Temp greater or equal to 38C (100.4F), Mild Pain (1 - 3)  HYDROmorphone  Injectable 0.5 milliGRAM(s) IV Push every 6 hours PRN Severe Pain (7 - 10)  LORazepam   Injectable 2 milliGRAM(s) IV Push every 4 hours PRN CIWA-Ar score 8 or greater    Allergies    Keflex (Other)    Intolerances      PAST MEDICAL & SURGICAL HISTORY:  MS (multiple sclerosis)    No significant past surgical history      FAMILY HISTORY:    SOCIAL HISTORY: non smoker    Review of Systems:  Constitutional: No generalized weakness. No fevers or chills                 Eyes, Ears, Mouth, Throat: No vision loss   Respiratory: No shortness of breath or cough                              Cardiovascular: No chest pain or palpitations  Gastrointestinal: (+) Nausea & vomiting                                        Genitourinary: No urinary incontinence or burning on urination  Musculoskeletal: No joint pain                                                       Dermatologic: No rash  Neurological: as per HPI                                                                      Psychiatric: No behavioral problems  Endocrine: No known hypoglycemia               Hematologic/Lymphatic: No easy bleeding    O:  Vital Signs Last 24 Hrs  T(C): 36.8 (26 Jan 2021 07:50), Max: 37.5 (25 Jan 2021 12:15)  T(F): 98.2 (26 Jan 2021 07:50), Max: 99.5 (25 Jan 2021 12:15)  HR: 102 (26 Jan 2021 07:50) (102 - 138)  BP: 119/69 (26 Jan 2021 07:50) (100/66 - 121/72)  RR: 20 (26 Jan 2021 07:50) (18 - 20)  SpO2: 97% (26 Jan 2021 07:50) (97% - 100%)    General Exam:   General appearance: No acute distress                 Cardiovascular: Pedal dorsalis pulses intact bilaterally    Mental Status: Oriented to self, date and place.  Attention intact.  No dysarthria, aphasia or neglect.  Knowledge intact.  Registration intact.  Short and long term memory grossly intact.      Cranial Nerves: CN I - not tested.  PERRL, EOMI, VFF, no nystagmus or diplopia.  No APD.  Fundi not visualized.  CN V1-3 intact to light touch.  No facial asymmetry.  Hearing intact to finger rub bilaterally.  Tongue, uvula and palate midline.  Sternocleidomastoid and Trapezius intact bilaterally.    Motor:   Tone: Normal                  Strength intact throughout  No pronator drift bilaterally                      No dysmetria on finger-nose-finger or heel-shin-heel  No truncal ataxia.  No resting, postural or action tremor.  No myoclonus.    Sensation: Intact to light touch    Deep Tendon Reflexes: 1+ bilateral biceps, triceps, brachioradialis, knee and ankle  Left Babinski.  Right toe flexor.    Gait: Normal and stable.  Romberg (-).    Other:     LABS:                        11.6   13.08 )-----------( 102      ( 25 Jan 2021 13:10 )             35.7     01-26    141  |  105  |  12  ----------------------------<  104<H>  4.1   |  27  |  1.28    Ca    6.8<L>      26 Jan 2021 07:06  Phos  5.2     01-26  Mg     1.9     01-26    TPro  5.9<L>  /  Alb  2.6<L>  /  TBili  1.4<H>  /  DBili  x   /  AST  22  /  ALT  18  /  AlkPhos  98  01-26    PT/INR - ( 25 Jan 2021 13:10 )   PT: 14.3 sec;   INR: 1.21 ratio         PTT - ( 25 Jan 2021 13:10 )  PTT:30.4 sec      RADIOLOGY & ADDITIONAL STUDIES:    EEG:  EEG Classification / Summary:  Normal EEG study, awake and briefly drowsy  Low amplitude diffuse beta was noted   -----------------------------------------------------------------------------------------------------    Clinical Impression:  There were no epileptiform abnormalities recorded.  This does not exclude a diagnosis of a seizure disorder.  Persistent generalized fast frequency activity may be a normal variant, but is typically a result of a sedative medication effect.    -------------------------------------------------------------------------------------------------------  [facility] EEG Reading Room Ph#: [phone]  Epilepsy Answering Service after 5PM and before 8:30AM: Ph#: [phone]    Jose Juan Rodriguez M.D.   of Neurology, [facility]	        Electronic Signatures:  Jose Juan Rodriguez)  (Signed 26-Jan-2021 11:16)  	Authored: TECH INFORMATION, EEG REPORT      Last Updated: 26-Jan-2021 11:16 by Jose Juan Rodriguez)    < from: CT Head No Cont (01.25.21 @ 16:32) >    EXAM:  CT MAXILLOFACIAL                          EXAM:  CT BRAIN                            PROCEDURE DATE:  01/25/2021          INTERPRETATION:  INDICATIONS:  Evaluate for abscess. MS, sz, has had sinus infection    CT BRAIN:    TECHNIQUE:  Multiple contiguous axial images were obtained from the skull base to the vertex without the use of intravenous contrast.    COMPARISON EXAMINATION: None available at this time.    FINDINGS:  Ventricles and sulci: Parenchymal volume loss is present which iscommensurate with patient age.  Intra-axial: There are hemispheric white matter areas of low attenuation which are nonspecific but likely related to sequelae of microvascular disease.  No intracranial mass, acute hemorrhage, or significant midline shift is present.  Extra-axial: Bilateral small suspected subdural hygromas measuring 6.3 mm in the anterior left side and 6.7 mm on the right side.  Visualized sinuses: No air-fluid levels are identified. Mild scattered soft tissue in the ethmoid air cells.  Visualized mastoids:  Clear.  Calvarium: Unremarkable.  Miscellaneous:  None.    Impression: See below    ======================================================================================    MAXILLOFACIAL CT:    TECHNIQUE: This examinationconsists of axial 1.3 mm sections from the frontal sinuses through the maxilla.  The study was supplemented with coronal and sagittal reformatted images.  Dedicated 3-D images were made using dedicated software and viewed on a dedicated 3-D workstation in multiple planes.    Intravenous contrast was not administered.    FINDINGS:    Evaluation of the osseous structures demonstrates no evidence of fracture.    Mild scattered soft tissue in some of the bilateral ethmoid air cells.    The nasopharynx is normal in appearance.    The parotid glands are normal and symmetric in appearance. Accessory parotid gland on the left side.    The  space is normal bilaterally.    Parapharyngeal space is normal bilaterally.    The tongue base is normal in appearance. Streak artifact from dental amalgam.    The epiglottis is normal in thickness and appearance.    [place] tonsils are normal in appearance.    The submandibular glands are normal and symmetric in appearance bilaterally.    The platysma is normal in thickness.    Sternocleidomastoid muscles are normal and symmetric in appearance bilaterally where visualized.    Anterior strap muscles are unremarkable where visualized.    Carotid space is normal bilaterally where visualized. Aberrant course of the right internal carotid artery.    No suspicious adenopathy.    Bilateral calcifications in the posterior globes and orbits.      IMPRESSIONS:    Head CT: No CT evidence of acute intracranial hemorrhage.  Small bilateral subdural hygromas.    Maxillofacial CT: Examination of the facial bones is within normal limits.  Mild scattered inflammation in the sinuses. No abscess.              MILAGRO FLORES MD, Attending Radiologist  This document has been electronically signed. Jan 25 2021  4:50PM    < end of copied text >

## 2021-01-26 NOTE — CONSULT NOTE ADULT - ATTENDING COMMENTS
I personally interviewed, examined, and participated in the care of this patient, on rounds 21 with NP Florian Claros.  Reviewed findings and management plan with her.      I note the following additional information re Hx:      He has had two episodes of unilateral visual loss (not sure if the same eye) with essentially complete loss of vision.  The first one ~25 years ago.  Had MR, LP, and was verbally told by neurologist-neuro-ophthalmologist Dr. Feng Brown that he had MS, but official Dx was optic neuritis.  Was given a short course of high dose methylprednisolone.  Several years later had another visual episode; again with Dr. Brown got methylprednisolone.  Family members told him he had dysconjugate gaze with one eye externally deviated and the other higer than the first.  He has never been on disease modifying Tx.  Retired from [employer].  As he does not need prior authorization for specialist visit, and he was retired, he sought out a cardiologist, neurologist, . . . . , on his own.  He had not seen a neurologist since the 2nd episode of optic neuritis.  He went to Dr. Rizo's office for the first time on the day of admission.  As he walked into her office he became lightheaded, collapse, next remembers EMS standing over him and being told that the neurologist said he had had a seizure.      He has never been athletic; always somewhat clumsy.      He consumes 5 or 6 16oz cans of beer/day.  Responds that has never had DTs or acute EtOH withdrawal.      Of particular note on admission labs is K= of 3.1 and Mg++ of 0.5.    Per Electrophysiology note he has developed a prolongation of QRS from ~450ms in office ECGs to 540ms on admission.  Per that note:    "He may have a subtle channelopathy, exacerbated by self-dosing Azithromycin.  Based on his significant QT prolongation and symptoms in the setting of this drug, he should not take Azithromycin ever again.  Consider outpatient cardio-genetics workup, as this may have further implications for medication dosing, or for his children.  As this seems to have been triggered by a medication, there is no indication for an ICD- there is a clear reversible cause."       On examination I note in particular, or in addition to or instead of the above, as follows:    Obese.  Tremulous distal UEs.  PERRL; EOMI; confrontation visual fields intact.  No dysmetria on FNF.  Devi symmetric, WNL.  Grosssly intact limb motor strength.  Vibration sensation severely impaired in toes, markedly at the ankles, moderately at the fingers.      IMPRESSION    Most likely had syncopal episode (with risk factors for cardiac rhythm disturbance of QT prolongation, low K+ and dangerouly low Mg++).     Reported convulsive activity.  If he had a true seizure it was one of the followin) syncope seizure;     2) seizure due to combined effects of electrolyte abnormalities and EtOH withdrawal state.     From the point of view of suspected multiple sclerosis he has done remarkably well over decades.       He is at risk for acute EtOH withdrawal.      RECOMMENDATIONS    Cardiac work-up in progress.    No indication for anticonvulsant Rx.      Monitor for EtOH withdrawal signs.     Out-Pt neurology follow-up re suspected multiple sclerosis. I personally interviewed, examined, and participated in the care of this patient, on rounds 21 with NP Florian Claros.  Reviewed findings and management plan with her.      I note the following additional information re Hx:      He has had two episodes of unilateral visual loss (not sure if the same eye) with essentially complete loss of vision.  The first one ~25 years ago.  Had MR, LP, and was verbally told by neurologist-neuro-ophthalmologist Dr. Feng Brown that he had MS, but official Dx was optic neuritis.  Was given a short course of high dose methylprednisolone.  Several years later had another visual episode; again with Dr. Brown got methylprednisolone.  Family members told him he had dysconjugate gaze with one eye externally deviated and the other higer than the first.  He has never been on disease modifying Tx.  Retired from [employer].  As he does not need prior authorization for specialist visit, and he was retired, he sought out a cardiologist, neurologist, . . . . , on his own.  He had not seen a neurologist since the 2nd episode of optic neuritis.  He went to Dr. Rizo's office for the first time on the day of admission.  As he walked into her office he became lightheaded, collapse, next remembers EMS standing over him and being told that the neurologist said he had had a seizure.      He has never been athletic; always somewhat clumsy.      He consumes 5 or 6 16oz cans of beer/day.  Responds that has never had DTs or acute EtOH withdrawal.      Of particular note on admission labs is K= of 3.1 and Mg++ of 0.5.    Per Electrophysiology note he has developed a prolongation of QRS from ~450ms in office ECGs to 540ms on admission.  Per that note:    "He may have a subtle channelopathy, exacerbated by self-dosing Azithromycin.  Based on his significant QT prolongation and symptoms in the setting of this drug, he should not take Azithromycin ever again.  Consider outpatient cardio-genetics workup, as this may have further implications for medication dosing, or for his children.  As this seems to have been triggered by a medication, there is no indication for an ICD- there is a clear reversible cause."       On examination I note in particular, or in addition to or instead of the above, as follows:    Obese.  Tremulous distal UEs.  PERRL; EOMI; confrontation visual fields intact.  No dysmetria on FNF.  Devi symmetric, WNL.  Grosssly intact limb motor strength.  Vibration sensation severely impaired in toes, markedly at the ankles, moderately at the fingers.      IMPRESSION    Most likely had syncopal episode (with risk factors for cardiac rhythm disturbance of QT prolongation, low K+ and dangerouly low Mg++).     Reported convulsive activity.  If he had a true seizure it was one of the followin) syncope seizure;     2) seizure due to combined effects of electrolyte abnormalities and EtOH withdrawal state.     From the point of view of suspected multiple sclerosis he has done remarkably well over decades.       Alcohol use disorder.    He is at risk for acute EtOH withdrawal.      RECOMMENDATIONS    Cardiac work-up in progress.    No indication for anticonvulsant Rx.      Monitor for EtOH withdrawal signs.     Out-Pt neurology follow-up re suspected multiple sclerosis.

## 2021-01-26 NOTE — CONSULT NOTE ADULT - SUBJECTIVE AND OBJECTIVE BOX
EP Attending  HISTORY OF PRESENT ILLNESS: HPI:  60 year old male from home with PMHx of multiple sclerosis and no significant PSHx presents to the ED  after experiencing an episode of seizure at his neurologist office. Patient reports that he has not had an MRI done in the past twenty-five years related to his MS and that he is not currently on medication for his condition, thus prompting him to go see his neurologist at [place] Dr. Rizo. Patient endorses that just prior to arrival today he experienced loss of consciousness while in the examination room of his doctor's examination room. Patient states that during the episode he was generally shaking, with his eyes open and rolling back with a right-sided gaze deviation. Patient reports that the entire episode lasted for approximately two to three minutes, after which he returned to his baseline spontaneously with some postictal confusion as per dr office. Patient was then escorted to the ED via EMS immediately. In the ED, patient denies any associated chest pain, shortness of breath, sweatiness, vomiting, and any other symptoms. Pt also has complaints of severe post nasal drip over the past few days. Patient reports that his symptoms are caused by a decrease in po intake, which he states has caused him to become very dehydrated. Patient endorses that he has been taking Benadryl 24 grams q4h at home in order to attempt to dry up the post nasal drip. Patient additionally complains of some mild dizziness. Pt endorsed diarrhoea for last 3 days and has been coughing up and vomiting mucous that is dripping from back of his throat. (25 Jan 2021 17:08)    1/26- seen in ED. Mr Nguyen is a 60yoM followed by Dr Gudino in our office, for mild hypertension.  His last echo and stress test were ~2yrs ago.  He has multiple sclerosis but no recent episodes.  He has one prior episode of fainting, many years ago.  He fainted while attending a Neurology office visit, collapsing suddenly in front of his wife and physician, who called EMS to evaluate him.  He has been dealing with sinus congestion for a few months since the Winter holidays, and had not found relief with nasal steroids and antihistamines and oral antihistamines.  He had a supply of drug samples from a family member, including Azithromycin. He was 4 days into this self-prescribed attempt at symptom relief when he had his fainting episode.  There was nearly no prodrome- just sudden lightheadedness and weakness in the legs before collapsing.  No chest pain, palpitations, shortness of breath, visual auditory or gustatory aura. When he awoke a few moments later he felt like he was back to normal, with no desire to urinate/defecate, and no weakness or numbness.  A 10 pt ROS is otherwise negative.    He does not smoke.  Drinks ~5-6 cans of beer per day.  No illicit drugs. No over the counter supplement use.    PAST MEDICAL & SURGICAL HISTORY:  MS (multiple sclerosis)    No significant past surgical history      MEDICATIONS  (STANDING):  aspirin  chewable 162 milliGRAM(s) Oral daily  calcitriol   Capsule 0.25 MICROGram(s) Oral daily  cefepime   IVPB 2000 milliGRAM(s) IV Intermittent every 8 hours  folic acid 1 milliGRAM(s) Oral daily  heparin   Injectable 5000 Unit(s) SubCutaneous every 12 hours  ondansetron Injectable 8 milliGRAM(s) IV Push two times a day  sodium chloride 0.9%. 1000 milliLiter(s) (80 mL/Hr) IV Continuous <Continuous>  thiamine Injectable 100 milliGRAM(s) IntraMuscular once  thiamine IVPB 500 milliGRAM(s) IV Intermittent every 8 hours    Allergies    Keflex (Other)    Intolerances    FAMILY HISTORY:  Non-contributary for premature coronary disease or sudden cardiac death    SOCIAL HISTORY:    [x ] Non-smoker  [ ] Smoker  [ x] Alcohol    PHYSICAL EXAM:  T(C): 36.6 (01-26-21 @ 11:55), Max: 36.9 (01-25-21 @ 15:44)  HR: 103 (01-26-21 @ 11:55) (102 - 104)  BP: 116/69 (01-26-21 @ 11:55) (100/66 - 119/69)  RR: 20 (01-26-21 @ 11:55) (18 - 20)  SpO2: 98% (01-26-21 @ 11:55) (97% - 100%)  Wt(kg): --    General: Well nourished, no acute distress, alert and oriented x 3  Head: normocephalic, no trauma  Neck: no JVD, no bruit, supple, not enlarged  CV: S1S2, no S3, regular rate, rhythm is SINUS, no murmurs.    Lungs: clear BL, no rales or wheezes  Abdomen: bowel sounds +, soft, nontender, nondistended  Extremities: no clubbing, cyanosis or edema  Neuro: Moves all 4 extremities, sensation intact x 4 extremities  Skin: warm and moist, normal turgor  Psych: Mood and affect are appropriate for circumstances  MSK: normal range of motion and strength x4 extremities.    TELEMETRY: NSR	    ECG:  Sinus rhythm, QTc ~540ms, ST depression/lateral T wave inversion.  ST/T waves accentuated compared to prior, and he has a history of QTc-prolongation (~450ms in office records).  Echo: Normal EF and valves 2019.    LABS:	 	                          11.6   13.08 )-----------( 102      ( 25 Jan 2021 13:10 )             35.7     01-26    141  |  105  |  12  ----------------------------<  104<H>  4.1   |  27  |  1.28    Ca    6.8<L>      26 Jan 2021 07:06  Phos  5.2     01-26  Mg     1.9     01-26    TPro  5.9<L>  /  Alb  2.6<L>  /  TBili  1.4<H>  /  DBili  x   /  AST  22  /  ALT  18  /  AlkPhos  98  01-26    proBNP: Serum Pro-Brain Natriuretic Peptide: 80 pg/mL (01-25 @ 17:59)    TSH: Thyroid Stimulating Hormone, Serum: 2.92 uU/mL (01-25 @ 17:59)      ASSESSMENT/PLAN: 	60y Male with concern for cardiogenic/arrhythmogenic syncope.      Reassuring prior Echo and Stress Testing, but should be repeated, as ischemia can lengthen QT and cause VF arrest, mimicking syncope.    He may have a subtle channelopathy, exacerbated by self-dosing Azithromycin.  Based on his significant QT prolongation and symptoms in the setting of this drug, he should not take Azithromycin ever again.  Consider outpatient cardio-genetics workup, as this may have further implications for medication dosing, or for his children.  As this seems to have been triggered by a medication, there is no indication for an ICD- there is a clear reversible cause.    Has an alcohol use disorder, drinking >30 cans of beer per week, he is open to the idea of cutting down significantly.    Avinash Key M.D.  Cardiac Electrophysiology    office [phone]  pager [phone]

## 2021-01-26 NOTE — CONSULT NOTE ADULT - SUBJECTIVE AND OBJECTIVE BOX
HISTORY OF PRESENT ILLNESS: HPI:  60 year old male from home with PMHx of multiple sclerosis and no significant PSHx presents to the ED  after experiencing an episode of seizure at his neurologist office. Patient reports that he has not had an MRI done in the past twenty-five years related to his MS and that he is not currently on medication for his condition, thus prompting him to go see his neurologist at [place] Dr. Rizo. Patient endorses that just prior to arrival today he experienced loss of consciousness while in the examination room of his doctor's examination room. Patient states that during the episode he was generally shaking, with his eyes open and rolling back with a right-sided gaze deviation. Patient reports that the entire episode lasted for approximately two to three minutes, after which he returned to his baseline spontaneously with some postictal confusion as per dr office. Patient was then escorted to the ED via EMS immediately. In the ED, patient denies any associated chest pain, shortness of breath, sweatiness, vomiting, and any other symptoms. Pt also has complaints of severe post nasal drip over the past few days. Patient reports that his symptoms are caused by a decrease in po intake, which he states has caused him to become very dehydrated. Patient endorses that he has been taking Benadryl 24 grams q4h at home in order to attempt to dry up the post nasal drip. Patient additionally complains of some mild dizziness. Pt endorsed diarrhoea for last 3 days and has been coughing up and vomiting mucous that is dripping from back of his throat. (25 Jan 2021 17:08)      PAST MEDICAL & SURGICAL HISTORY:  MS (multiple sclerosis)    No significant past surgical history            MEDICATIONS:  MEDICATIONS  (STANDING):  aspirin  chewable 162 milliGRAM(s) Oral daily  calcitriol   Capsule 0.25 MICROGram(s) Oral daily  cefepime   IVPB 2000 milliGRAM(s) IV Intermittent every 8 hours  folic acid 1 milliGRAM(s) Oral daily  heparin   Injectable 5000 Unit(s) SubCutaneous every 12 hours  ondansetron Injectable 8 milliGRAM(s) IV Push two times a day  sodium chloride 0.9%. 1000 milliLiter(s) (80 mL/Hr) IV Continuous <Continuous>  thiamine Injectable 100 milliGRAM(s) IntraMuscular once  thiamine IVPB 500 milliGRAM(s) IV Intermittent every 8 hours      Allergies    Keflex (Other)    Intolerances        FAMILY HISTORY:    Non-contributary for premature coronary disease or sudden cardiac death    SOCIAL HISTORY:    [X ] Non-smoker  [ ] Smoker  [ ] Alcohol        REVIEW OF SYSTEMS:  [ ]chest pain  [  ]shortness of breath  [  ]palpitations  [ X ]syncope  [ ]near syncope [ ]upper extremity weakness   [ ] lower extremity weakness  [  ]diplopia  [  ]altered mental status   [  ]fevers  [ ]chills [ ]nausea  [ ]vomitting  [  ]dysphagia    [ ]abdominal pain  [ ]melena  [ ]BRBPR    [  ]epistaxis  [  ]rash    [ ]lower extremity edema        [ x] All others negative	  [ ] Unable to obtain      LABS:	 	    CARDIAC MARKERS:  CARDIAC MARKERS ( 25 Jan 2021 17:59 )  <0.015 ng/mL / x     / x     / x     / x      CARDIAC MARKERS ( 25 Jan 2021 14:26 )  <0.015 ng/mL / x     / x     / x     / x      CARDIAC MARKERS ( 25 Jan 2021 13:10 )  <0.015 ng/mL / x     / x     / x     / x                                  11.6   13.08 )-----------( 102      ( 25 Jan 2021 13:10 )             35.7     Hb Trend: 11.6<--    01-26    141  |  105  |  12  ----------------------------<  104<H>  4.1   |  27  |  1.28    Ca    6.8<L>      26 Jan 2021 07:06  Phos  5.2     01-26  Mg     1.9     01-26    TPro  5.9<L>  /  Alb  2.6<L>  /  TBili  1.4<H>  /  DBili  x   /  AST  22  /  ALT  18  /  AlkPhos  98  01-26    Creatinine Trend: 1.28<--, 1.53<--, 1.67<--      proBNP: Serum Pro-Brain Natriuretic Peptide: 80 pg/mL (01-25 @ 17:59)    TSH: Thyroid Stimulating Hormone, Serum: 2.92 uU/mL (01-25 @ 17:59)      PHYSICAL EXAM:  T(C): 36.8 (01-26-21 @ 07:50), Max: 37.5 (01-25-21 @ 12:15)  HR: 102 (01-26-21 @ 07:50) (102 - 138)  BP: 119/69 (01-26-21 @ 07:50) (100/66 - 121/72)  RR: 20 (01-26-21 @ 07:50) (18 - 20)  SpO2: 97% (01-26-21 @ 07:50) (97% - 100%)  Wt(kg): --   BMI (kg/m2): 32.1 (01-25-21 @ 12:15)  I&O's Summary      HEENT:  (-)icterus (-)pallor  CV: N S1 S2 1/6 MARYANNE (+)2 Pulses B/l  Resp:  Clear to ausculatation B/L, normal effort  GI: (+) BS Soft, NT, ND  Lymph:  (-)Edema, (-)obvious lymphadenopathy  Skin: Warm to touch, Normal turgor  Psych: Appropriate mood and affect    TELEMETRY: 	  Sinus    ECG:  	Sinus tach 133 BPM, nonspecific ST abnormality    RADIOLOGY:         CXR:   No infiltrate     ASSESSMENT/PLAN: 	60y Male PMHx of multiple sclerosis and no significant PSHx presents to the ED  after experiencing an episode of LOC ? seizure at his neurologist office.    - F/u Echo  - Cont tele  - EEG per neuro  - Echo  - Orhtostatic BP  - given second episode of unexplained LOC EP eval Dr. Key called  - has not had a recent ischemic eval, if above without pertinent findings plan for stress test given his abnormal EKG findings.    I once again thank you for allowing me to participate in the care of your patient.  If you have any questions or concerns please do not hesitate to contact me.    Edmond Wagner MD, [facility]  BEEPER [phone]

## 2021-01-26 NOTE — CONSULT NOTE ADULT - ASSESSMENT
59yo male  59yo male w/ seizure in setting of electrolyte abnormality    Recommendations:    -This single episode in the setting of electrolyte abnormalities does not warrant an AED.    -Continued mgmt per primary team

## 2021-01-27 ENCOUNTER — TRANSCRIPTION ENCOUNTER (OUTPATIENT)
Age: 61
End: 2021-01-27

## 2021-01-27 DIAGNOSIS — R94.31 ABNORMAL ELECTROCARDIOGRAM [ECG] [EKG]: ICD-10-CM

## 2021-01-27 DIAGNOSIS — R19.8 OTHER SPECIFIED SYMPTOMS AND SIGNS INVOLVING THE DIGESTIVE SYSTEM AND ABDOMEN: ICD-10-CM

## 2021-01-27 LAB
AMMONIA BLD-MCNC: 27 UMOL/L — SIGNIFICANT CHANGE UP (ref 11–32)
ANION GAP SERPL CALC-SCNC: 7 MMOL/L — SIGNIFICANT CHANGE UP (ref 5–17)
BUN SERPL-MCNC: 9 MG/DL — SIGNIFICANT CHANGE UP (ref 7–18)
CALCIUM SERPL-MCNC: 7 MG/DL — LOW (ref 8.4–10.5)
CEA SERPL-MCNC: 5.1 NG/ML — HIGH (ref 0–3.8)
CHLORIDE SERPL-SCNC: 107 MMOL/L — SIGNIFICANT CHANGE UP (ref 96–108)
CO2 SERPL-SCNC: 26 MMOL/L — SIGNIFICANT CHANGE UP (ref 22–31)
CREAT SERPL-MCNC: 1.29 MG/DL — SIGNIFICANT CHANGE UP (ref 0.5–1.3)
ETHANOL SERPL-MCNC: <3 MG/DL — SIGNIFICANT CHANGE UP (ref 0–10)
FOLATE SERPL-MCNC: 9.1 NG/ML — SIGNIFICANT CHANGE UP
GLUCOSE SERPL-MCNC: 98 MG/DL — SIGNIFICANT CHANGE UP (ref 70–99)
HCT VFR BLD CALC: 32.4 % — LOW (ref 39–50)
HGB BLD-MCNC: 10.4 G/DL — LOW (ref 13–17)
MAGNESIUM SERPL-MCNC: 1.4 MG/DL — LOW (ref 1.6–2.6)
MAGNESIUM UR-MCNC: 14.2 MG/DL — SIGNIFICANT CHANGE UP
MCHC RBC-ENTMCNC: 30 PG — SIGNIFICANT CHANGE UP (ref 27–34)
MCHC RBC-ENTMCNC: 32.1 GM/DL — SIGNIFICANT CHANGE UP (ref 32–36)
MCV RBC AUTO: 93.4 FL — SIGNIFICANT CHANGE UP (ref 80–100)
NRBC # BLD: 0 /100 WBCS — SIGNIFICANT CHANGE UP (ref 0–0)
PHOSPHATE SERPL-MCNC: 3.1 MG/DL — SIGNIFICANT CHANGE UP (ref 2.5–4.5)
PLATELET # BLD AUTO: 68 K/UL — LOW (ref 150–400)
POTASSIUM SERPL-MCNC: 3.9 MMOL/L — SIGNIFICANT CHANGE UP (ref 3.5–5.3)
POTASSIUM SERPL-SCNC: 3.9 MMOL/L — SIGNIFICANT CHANGE UP (ref 3.5–5.3)
RBC # BLD: 3.47 M/UL — LOW (ref 4.2–5.8)
RBC # FLD: 17.8 % — HIGH (ref 10.3–14.5)
SALICYLATES SERPL-MCNC: <1.7 MG/DL — LOW (ref 2.8–20)
SODIUM SERPL-SCNC: 140 MMOL/L — SIGNIFICANT CHANGE UP (ref 135–145)
VIT B12 SERPL-MCNC: 356 PG/ML — SIGNIFICANT CHANGE UP (ref 232–1245)
WBC # BLD: 5.94 K/UL — SIGNIFICANT CHANGE UP (ref 3.8–10.5)
WBC # FLD AUTO: 5.94 K/UL — SIGNIFICANT CHANGE UP (ref 3.8–10.5)

## 2021-01-27 RX ORDER — PANTOPRAZOLE SODIUM 20 MG/1
40 TABLET, DELAYED RELEASE ORAL
Refills: 0 | Status: DISCONTINUED | OUTPATIENT
Start: 2021-01-27 | End: 2021-01-28

## 2021-01-27 RX ORDER — METOPROLOL TARTRATE 50 MG
25 TABLET ORAL DAILY
Refills: 0 | Status: DISCONTINUED | OUTPATIENT
Start: 2021-01-27 | End: 2021-01-28

## 2021-01-27 RX ORDER — MAGNESIUM SULFATE 500 MG/ML
2 VIAL (ML) INJECTION ONCE
Refills: 0 | Status: COMPLETED | OUTPATIENT
Start: 2021-01-27 | End: 2021-01-27

## 2021-01-27 RX ORDER — CALCITRIOL 0.5 UG/1
0.5 CAPSULE ORAL DAILY
Refills: 0 | Status: DISCONTINUED | OUTPATIENT
Start: 2021-01-27 | End: 2021-01-28

## 2021-01-27 RX ADMIN — Medication 105 MILLIGRAM(S): at 05:35

## 2021-01-27 RX ADMIN — Medication 650 MILLIGRAM(S): at 12:01

## 2021-01-27 RX ADMIN — PANTOPRAZOLE SODIUM 40 MILLIGRAM(S): 20 TABLET, DELAYED RELEASE ORAL at 17:42

## 2021-01-27 RX ADMIN — Medication 105 MILLIGRAM(S): at 22:58

## 2021-01-27 RX ADMIN — HEPARIN SODIUM 5000 UNIT(S): 5000 INJECTION INTRAVENOUS; SUBCUTANEOUS at 17:42

## 2021-01-27 RX ADMIN — Medication 105 MILLIGRAM(S): at 14:19

## 2021-01-27 RX ADMIN — Medication 25 MILLIGRAM(S): at 17:42

## 2021-01-27 RX ADMIN — CALCITRIOL 0.5 MICROGRAM(S): 0.5 CAPSULE ORAL at 12:02

## 2021-01-27 RX ADMIN — Medication 162 MILLIGRAM(S): at 12:01

## 2021-01-27 RX ADMIN — HEPARIN SODIUM 5000 UNIT(S): 5000 INJECTION INTRAVENOUS; SUBCUTANEOUS at 05:35

## 2021-01-27 RX ADMIN — Medication 50 GRAM(S): at 12:02

## 2021-01-27 RX ADMIN — Medication 1 MILLIGRAM(S): at 12:02

## 2021-01-27 RX ADMIN — Medication 50 GRAM(S): at 17:42

## 2021-01-27 NOTE — DISCHARGE NOTE PROVIDER - HOSPITAL COURSE
60 year old male from home with PMHx of multiple sclerosis and no significant PSHx presents to the ED  after experiencing an episode of seizure at his neurologist office. Patient reports that he has not had an MRI done in the past twenty-five years related to his MS and that he is not currently on medication for his condition, thus prompting him to go see his neurologist at [place] Dr. Rizo. Patient endorses that just prior to arrival today he experienced loss of consciousness while in the examination room of his doctor's examination room. Patient states that during the episode he was generally shaking, with his eyes open and rolling back with a right-sided gaze deviation. Patient reports that the entire episode lasted for approximately two to three minutes, after which he returned to his baseline spontaneously with some postictal confusion as per dr office. Patient was then escorted to the ED via EMS immediately. In the ED, patient denies any associated chest pain, shortness of breath, sweatiness, vomiting, and any other symptoms. Pt also has complaints of severe post nasal drip over the past few days. Patient reports that his symptoms are caused by a decrease in po intake, which he states has caused him to become very dehydrated. Patient endorses that he has been taking Benadryl 24 grams q4h at home in order to attempt to dry up the post nasal drip. Patient additionally complains of some mild dizziness. Pt endorsed diarrhoea for last 3 days and has been coughing up and vomiting mucous that is dripping from back of his throat. 60 year old male from home with PMHx of multiple sclerosis , Alcohol abuse and no significant PSHx presents to the ED  after experiencing an episode of seizure at his neurologist office. Patient reports that he has not had an MRI done in the past twenty-five years related to his MS and that he is not currently on medication for his condition, thus prompting him to go see his neurologist at [place] Dr. Rizo.     Neuro was consulted recommended syncopal episode (with risk factors for cardiac rhythm disturbance of QT prolongation, low K+ and dangerously low Mg++) so likely syncope seizure and seizure due to combined effects of electrolyte abnormalities and EtOH withdrawal state.   Cardiology was consulted . Pt with repeated, as ischemia can lengthen QT and cause VF arrest, mimicking syncope. He may have a subtle channelopathy, exacerbated by self-dosing Azithromycin.  Based on his significant QT prolongation and symptoms in the setting of this drug, he should not take Azithromycin ever again. Outpatient cardio-genetics workup, as this may have further implications for medication dosing, or for his children. As this seems to have been triggered by a medication, there is no indication for an ICD- there is a clear reversible cause. Started on Toprol XL 25 QD empirically.   Pt have low potassium, magnesium and calcium replaced as needed.   Pt also has new GI symptoms such as nausea, anemia , mucoid stool. GI consulted. FOBT showed          60 year old male from home with PMHx of multiple sclerosis , Alcohol abuse and no significant PSHx presents to the ED  after experiencing an episode of seizure at his neurologist office. Patient reports that he has not had an MRI done in the past twenty-five years related to his MS and that he is not currently on medication for his condition, thus prompting him to go see his neurologist at [place] Dr. Rizo.     Neuro was consulted recommended syncopal episode (with risk factors for cardiac rhythm disturbance of QT prolongation, low K+ and dangerously low Mg++) so likely syncope seizure and seizure due to combined effects of electrolyte abnormalities and EtOH withdrawal state.   Cardiology was consulted for cardiogenic syncope . Pt with repeated stress test which came back negative, as ischemia can lengthen QT and cause VF arrest, mimicking syncope. He may have a subtle channelopathy, exacerbated by self-dosing Azithromycin.  Based on his significant QT prolongation and symptoms in the setting of this drug, he should not take Azithromycin ever again. Outpatient cardio-genetics workup, as this may have further implications for medication dosing, or for his children. As this seems to have been triggered by a medication, there is no indication for an ICD- there is a clear reversible cause. Started on Toprol XL 25 QD empirically.   Pt have low potassium, magnesium and calcium replaced as needed.   Pt also has new GI symptoms such as nausea, anemia , mucoid stool. GI consulted. CEA level was  5,mildly elevated. Pt has been advised to follow up with GI Dr Clau GOODWIN for further work up of his anemia , elevated CEA. Pt agreeing on the plan. He has been advised to see his PCP and Cardiologist outpatient.   Pt would be going home on Toprol XL 25mg OD, Calcitrol 0.5mg once daily, statin and PO Mg 400mg BID.   Pt is medically optimized and ready to be discharged home.

## 2021-01-27 NOTE — PROGRESS NOTE ADULT - PROBLEM SELECTOR PLAN 4
pt p/w 3.2  replaced  f/u bmp daily -Pt has an episode of loss of consciousness with dizziness  -orthostatics negative   -EEG did not show any epileptic seizure   -CT angio neg for PE  -ECHO normal EF with no diastolic dysfunction   plan as above - Pt with prolong QT   Cardiology was consulted . Pt with repeated stress test today, as ischemia can lengthen QT and cause VF arrest, mimicking syncope. He may have a subtle channelopathy, exacerbated by self-dosing Azithromycin.  Based on his significant QT prolongation and symptoms in the setting of this drug, he should not take Azithromycin ever again. Outpatient cardio-genetics workup, as this may have further implications for medication dosing, or for his children. As this seems to have been triggered by a medication, there is no indication for an ICD- there is a clear reversible cause. Started on Toprol XL 25 QD empirically.   -Stress test came negative  -Cardio Dr. Wagner   -Avoid giving QT prolongation meds

## 2021-01-27 NOTE — CONSULT NOTE ADULT - ASSESSMENT
1. Iron deficiency anemia  2. Constipation  3. Intermittent rectal bleeding with bowel movement  4. R/o colorectal neoplasm  5. Vomiting improved  6. No evidence of acute GI bleeding    Suggestions:    1. Monitor H/H  2. Transfuse PRBC as needed  3. Protonix daily  4. Avoid NSAID  5. Check CEA  6. Colonoscopy out patient  7. Daily stool softener / Laxative  8. High fiber diet  9. DVT prophylaxis

## 2021-01-27 NOTE — PROGRESS NOTE ADULT - SUBJECTIVE AND OBJECTIVE BOX
EP Attending  HISTORY OF PRESENT ILLNESS: HPI:  60 year old male from home with PMHx of multiple sclerosis and no significant PSHx presents to the ED  after experiencing an episode of seizure at his neurologist office. Patient reports that he has not had an MRI done in the past twenty-five years related to his MS and that he is not currently on medication for his condition, thus prompting him to go see his neurologist at [facility]. Patient endorses that just prior to arrival today he experienced loss of consciousness while in the examination room of his doctor's examination room. Patient states that during the episode he was generally shaking, with his eyes open and rolling back with a right-sided gaze deviation. Patient reports that the entire episode lasted for approximately two to three minutes, after which he returned to his baseline spontaneously with some postictal confusion as per dr office. Patient was then escorted to the ED via EMS immediately. In the ED, patient denies any associated chest pain, shortness of breath, sweatiness, vomiting, and any other symptoms. Pt also has complaints of severe post nasal drip over the past few days. Patient reports that his symptoms are caused by a decrease in po intake, which he states has caused him to become very dehydrated. Patient endorses that he has been taking Benadryl 24 grams q4h at home in order to attempt to dry up the post nasal drip. Patient additionally complains of some mild dizziness. Pt endorsed diarrhoea for last 3 days and has been coughing up and vomiting mucous that is dripping from back of his throat. (25 Jan 2021 17:08)    1/26- seen in ED. Mr Nguyen is a 60yoM followed by Dr Gudino in our office, for mild hypertension.  His last echo and stress test were ~2yrs ago.  He has multiple sclerosis but no recent episodes.  He has one prior episode of fainting, many years ago.  He fainted while attending a Neurology office visit, collapsing suddenly in front of his wife and physician, who called EMS to evaluate him.  He has been dealing with sinus congestion for a few months since the Winter holidays, and had not found relief with nasal steroids and antihistamines and oral antihistamines.  He had a supply of drug samples from a family member, including Azithromycin. He was 4 days into this self-prescribed attempt at symptom relief when he had his fainting episode.  There was nearly no prodrome- just sudden lightheadedness and weakness in the legs before collapsing.  No chest pain, palpitations, shortness of breath, visual auditory or gustatory aura. When he awoke a few moments later he felt like he was back to normal, with no desire to urinate/defecate, and no weakness or numbness.  A 10 pt ROS is otherwise negative.    1/27- anxious to go home today, no new complaints.  walking without lightheadedness or dizziness.    acetaminophen   Tablet .. 650 milliGRAM(s) Oral every 6 hours PRN  aspirin  chewable 162 milliGRAM(s) Oral daily  calcitriol   Capsule 0.5 MICROGram(s) Oral daily  folic acid 1 milliGRAM(s) Oral daily  heparin   Injectable 5000 Unit(s) SubCutaneous every 12 hours  HYDROmorphone  Injectable 0.5 milliGRAM(s) IV Push every 6 hours PRN  LORazepam   Injectable 2 milliGRAM(s) IV Push every 4 hours PRN  metoprolol succinate ER 25 milliGRAM(s) Oral daily  ondansetron Injectable 8 milliGRAM(s) IV Push two times a day  pantoprazole    Tablet 40 milliGRAM(s) Oral before breakfast  sodium chloride 0.9%. 1000 milliLiter(s) IV Continuous <Continuous>  thiamine Injectable 100 milliGRAM(s) IntraMuscular once  thiamine IVPB 500 milliGRAM(s) IV Intermittent every 8 hours                            10.4   5.94  )-----------( 68       ( 27 Jan 2021 07:50 )             32.4       01-27    140  |  107  |  9   ----------------------------<  98  3.9   |  26  |  1.29    Ca    7.0<L>      27 Jan 2021 07:50  Phos  3.1     01-27  Mg     1.4     01-27    TPro  5.9<L>  /  Alb  2.6<L>  /  TBili  1.4<H>  /  DBili  x   /  AST  22  /  ALT  18  /  AlkPhos  98  01-26    CARDIAC MARKERS ( 25 Jan 2021 17:59 )  <0.015 ng/mL / x     / x     / x     / x      CARDIAC MARKERS ( 25 Jan 2021 14:26 )  <0.015 ng/mL / x     / x     / x     / x      CARDIAC MARKERS ( 25 Jan 2021 13:10 )  <0.015 ng/mL / x     / x     / x     / x        T(C): 36.6 (01-27-21 @ 11:52), Max: 37.2 (01-26-21 @ 23:25)  HR: 104 (01-27-21 @ 11:52) (70 - 107)  BP: 138/69 (01-27-21 @ 11:52) (122/75 - 146/78)  RR: 18 (01-27-21 @ 11:52) (17 - 20)  SpO2: 100% (01-27-21 @ 11:52) (96% - 100%)  Wt(kg): --    I&O's Summary    General: Well nourished, no acute distress, alert and oriented x 3  Head: normocephalic, no trauma  Neck: no JVD, no bruit, supple, not enlarged  CV: S1S2, no S3, regular rate, rhythm is SINUS, no murmurs.    Lungs: clear BL, no rales or wheezes  Abdomen: bowel sounds +, soft, nontender, nondistended  Extremities: no clubbing, cyanosis or edema  Neuro: Moves all 4 extremities, sensation intact x 4 extremities  Skin: warm and moist, normal turgor  Psych: Mood and affect are appropriate for circumstances  MSK: normal range of motion and strength x4 extremities.    TELEMETRY: Sinus tachy overnight.  QT interval remains > 50% of RR interval.    ECG:  Sinus rhythm, QTc ~540ms, ST depression/lateral T wave inversion.  ST/T waves accentuated compared to prior, and he has a history of QTc-prolongation (~450ms in office records).  Echo: Normal EF and valves 2019.    ASSESSMENT/PLAN: 	60y Male with concern for cardiogenic/arrhythmogenic syncope.      1) Stress test results per Dr Wagner.  2) Recommend beta blocker, i.e. Metoprolol 25mg BID.  3) Repeat EKG.  4) Replace K to 4-4.5, Mg to 2. (may need 4g Mag today, suspect whole-body depleted due to EtOH use)  5) Recommend Cardio-Genetics as outpatient re: QT prolongation.  6) No indication for ICD at this time.  7) EtOH cessation.    Discharge pending clearance from general cardio, to followup with Dr Gudino.    Avinash Key M.D.  Cardiac Electrophysiology    office [phone]  pager [phone]

## 2021-01-27 NOTE — DISCHARGE NOTE PROVIDER - PROVIDER TOKENS
PROVIDER:[TOKEN:[[id number]]],PROVIDER:[TOKEN:[[id number]]],FREE:[LAST:[Cardiologist Dr Gudino],PHONE:[(   )    -],FAX:[(   )    -]]

## 2021-01-27 NOTE — PROGRESS NOTE ADULT - PROBLEM SELECTOR PLAN 3
Pt has an episode of loss of consciousness with dizziness  f/u orthostatics  f/u eeg   f/u ct angio to r/o pe  f/u echo  plan as above -Pt p/w cr 1.6  -Pre renal  -Improved

## 2021-01-27 NOTE — DISCHARGE NOTE PROVIDER - NSDCCPCAREPLAN_GEN_ALL_CORE_FT
PRINCIPAL DISCHARGE DIAGNOSIS  Diagnosis: Seizure  Assessment and Plan of Treatment: You came with seizure. Neuro was consulted recommended lighheadedness in the setting of ekg changes QT prolongation as well as low potassium , low magnesium. It could be a combine effect of the low magnesium plus potassium with alcohol drinking side effects. CT head was negative. you donot get any treatment right now.      SECONDARY DISCHARGE DIAGNOSES  Diagnosis: Hypokalemia  Assessment and Plan of Treatment: Your potassium was low which was replaced.    Diagnosis: MS (multiple sclerosis)  Assessment and Plan of Treatment: Your MS is pretty stable. Follow up with the neurologist for further care.    Diagnosis: Gastrointestinal problem  Assessment and Plan of Treatment: You started having nausea. your hemoglobin red blood cell was low (anemia) . GI consulted    Diagnosis: ALICE (acute kidney injury)  Assessment and Plan of Treatment: Your creatinine was getting worse likley dehydration.    Diagnosis: Hypomagnesemia  Assessment and Plan of Treatment: Replaced     PRINCIPAL DISCHARGE DIAGNOSIS  Diagnosis: Seizure  Assessment and Plan of Treatment: You came with seizure. Neuro was consulted recommended lighheadedness and fainting  in the setting of ekg changes QT prolongation as well as low potassium , low magnesium. It could be a combine effect of the low magnesium plus potassium with alcohol drinking side effects. CT head was negative. you donot require any treatment right now. follow up with your neurologist for further care. Cardiology was consulted as well due to syncope from the electrolyte imbalance as well ad EKG changes . You had a stress test which was negative. the reason to do the stress test because any blockage in the blood supply of heart can cause EKG changes which you had as well as it can cause irregular heart rate as well which aslo cause fainting. You may have a subtle channelopathy(problem in the channels of the heart for the elctrolye balance), exacerbated by self-dosing Azithromycin.  Based on your significant QT prolongation on EKG and symptoms in the setting of azithromycin drug, you should not take Azithromycin ever again. Outpatient cardio-genetics workup, as this may have further implications for medication dosing, or for your children. You were Started on Toprol XL 25 QD empirically.   Your low potassium, magnesium and calcium replaced as needed. You have been going home on magnesium 400mg twice daily, Toprol XL 25mg once daily and Calcitriol 0.5mg once daily . You have been advised to follw up with your Cardiologist and PCP within a week after discharge.      SECONDARY DISCHARGE DIAGNOSES  Diagnosis: Gastrointestinal problem  Assessment and Plan of Treatment: You started having nausea. your hemoglobin red blood cell was low (iron deficinency anemia). GI consulted. one of the tumor CEA was mildy elevated. You have tyrone advised to follow up with Dr Olguin outpatient for further work up.    Diagnosis: Hypokalemia  Assessment and Plan of Treatment: Your potassium was low which was replaced.    Diagnosis: MS (multiple sclerosis)  Assessment and Plan of Treatment: Your MS is pretty stable. Follow up with the neurologist for further care.    Diagnosis: Hypocalcemia  Assessment and Plan of Treatment: Your calcium was low. Replaced . You would be going home on Calcitriol 0.5 mg once daily    Diagnosis: Hypertension  Assessment and Plan of Treatment: You were on lisinopril 2.5mg once daily as now you have been started on Toprol XL 25mg once daily. BP has been controlled . Stopped taking lisinopril.    Diagnosis: ALICE (acute kidney injury)  Assessment and Plan of Treatment: Your creatinine was getting worse likley dehydration.    Diagnosis: Hypomagnesemia  Assessment and Plan of Treatment: Replaced. You would be going home on Magnesium Oxide 400mg twice daily. F/U with PCP in a week afetr discharge.

## 2021-01-27 NOTE — PROGRESS NOTE ADULT - PROBLEM SELECTOR PLAN 6
aggresively replaced   f/u mag serum levels  f/u urine levels' -Pt p/w wbc 13, hr 138, rr 20, lactate 3.5, no apparent source meets 3/4 criterion of sirs  -COVID neg  -s/p zosyn in ed  -BCx negative  -Will dc antibiotics -Aggressively replaced   -Urine magnesium 14.2  -Magnesium 1.4 , replaced with 4GM  -Hypocalcemia came with Ca of 6.7 , corrected calcium 7.7   -On calcitriol 0.5mg once daily

## 2021-01-27 NOTE — CONSULT NOTE ADULT - NEGATIVE ENMT SYMPTOMS
no hearing difficulty/no ear pain/no tinnitus/no sinus symptoms/no nose bleeds/no gum bleeding/no dry mouth/no throat pain/no dysphagia

## 2021-01-27 NOTE — PROGRESS NOTE ADULT - SUBJECTIVE AND OBJECTIVE BOX
Patient is a 60y old  Male who presents with a chief complaint of seizure (27 Jan 2021 09:28)(?)/syncopy/dehydration/electrolyte umbalance, lab showed Iron deficiency anemia, F/U stool occult, F/U EEG/NST result, episode agitation      INTERVAL HPI/OVERNIGHT EVENTS:  T(C): 36.6 (01-27-21 @ 07:19), Max: 37.2 (01-26-21 @ 23:25)  HR: 92 (01-27-21 @ 07:19) (70 - 107)  BP: 128/76 (01-27-21 @ 07:19) (116/69 - 146/78)  RR: 18 (01-27-21 @ 07:19) (17 - 20)  SpO2: 98% (01-27-21 @ 07:19) (96% - 100%)  Wt(kg): --    LABS:                        10.4   5.94  )-----------( 68       ( 27 Jan 2021 07:50 )             32.4     01-27    140  |  107  |  9   ----------------------------<  98  3.9   |  26  |  1.29    Ca    7.0<L>      27 Jan 2021 07:50  Phos  3.1     01-27  Mg     1.4     01-27    TPro  5.9<L>  /  Alb  2.6<L>  /  TBili  1.4<H>  /  DBili  x   /  AST  22  /  ALT  18  /  AlkPhos  98  01-26    PT/INR - ( 25 Jan 2021 13:10 )   PT: 14.3 sec;   INR: 1.21 ratio         PTT - ( 25 Jan 2021 13:10 )  PTT:30.4 sec    CAPILLARY BLOOD GLUCOSE            RADIOLOGY & ADDITIONAL TESTS:    Consultant(s) Notes Reviewed:  [x ] YES  [ ] NO    PHYSICAL EXAM:  GENERAL: well built, well nourished  HEAD:  Atraumatic, Normocephalic  EYES: EOMI, PERRLA, conjunctiva and sclera clear  ENT: No tonsillar erythema, exudates, or enlargement; Moist mucous membranes, Good dentition, No lesions  NECK: Supple, No JVD, Normal thyroid, no enlarged nodes  NERVOUS SYSTEM:  Alert & Oriented X3, Good concentration; Motor Strength 5/5 B/L upper and lower extremities; DTRs 2+ intact and symmetric, sensory intact  CHEST/LUNG: B/L good air entry; No rales, rhonchi, or wheezing  HEART: S1S2 normal, no S3, Regular rate and rhythm; No murmurs, rubs, or gallops  ABDOMEN: Soft, Nontender, Nondistended; Bowel sounds present  EXTREMITIES:  2+ Peripheral Pulses, No clubbing, cyanosis, or edema  LYMPH: No lymphadenopathy noted  SKIN: No rashes or lesions    Care Discussed with Consultants/Other Providers [ x] YES  [ ] NO

## 2021-01-27 NOTE — SBIRT NOTE ADULT - NSSBIRTALCPOSREINDET_GEN_A_CORE
Positive SBIRT Consult  Pt admitted to drinking alcohol occasionally and claimed not to have any problems with his alcohol intake. However, positive reinforcement provided via education / counseling.

## 2021-01-27 NOTE — PROGRESS NOTE ADULT - SUBJECTIVE AND OBJECTIVE BOX
Patient denies chest pain or shortness of breath.   Review of systems otherwise (-)  	  MEDICATIONS:  MEDICATIONS  (STANDING):  aspirin  chewable 162 milliGRAM(s) Oral daily  calcitriol   Capsule 0.5 MICROGram(s) Oral daily  folic acid 1 milliGRAM(s) Oral daily  heparin   Injectable 5000 Unit(s) SubCutaneous every 12 hours  ondansetron Injectable 8 milliGRAM(s) IV Push two times a day  pantoprazole    Tablet 40 milliGRAM(s) Oral before breakfast  sodium chloride 0.9%. 1000 milliLiter(s) (80 mL/Hr) IV Continuous <Continuous>  thiamine Injectable 100 milliGRAM(s) IntraMuscular once  thiamine IVPB 500 milliGRAM(s) IV Intermittent every 8 hours      LABS:	 	    CARDIAC MARKERS:  CARDIAC MARKERS ( 25 Jan 2021 17:59 )  <0.015 ng/mL / x     / x     / x     / x      CARDIAC MARKERS ( 25 Jan 2021 14:26 )  <0.015 ng/mL / x     / x     / x     / x      CARDIAC MARKERS ( 25 Jan 2021 13:10 )  <0.015 ng/mL / x     / x     / x     / x                                    10.4   5.94  )-----------( 68       ( 27 Jan 2021 07:50 )             32.4     Hemoglobin: 10.4 g/dL (01-27 @ 07:50)  Hemoglobin: 11.6 g/dL (01-25 @ 13:10)      01-27    140  |  107  |  9   ----------------------------<  98  3.9   |  26  |  1.29    Ca    7.0<L>      27 Jan 2021 07:50  Phos  3.1     01-27  Mg     1.4     01-27    TPro  5.9<L>  /  Alb  2.6<L>  /  TBili  1.4<H>  /  DBili  x   /  AST  22  /  ALT  18  /  AlkPhos  98  01-26    Creatinine Trend: 1.29<--, 1.28<--, 1.53<--, 1.67<--      PHYSICAL EXAM:  T(C): 36.6 (01-27-21 @ 11:52), Max: 37.2 (01-26-21 @ 23:25)  HR: 104 (01-27-21 @ 11:52) (70 - 107)  BP: 138/69 (01-27-21 @ 11:52) (122/75 - 146/78)  RR: 18 (01-27-21 @ 11:52) (17 - 20)  SpO2: 100% (01-27-21 @ 11:52) (96% - 100%)  Wt(kg): --  I&O's Summary      HEENT:  (-)icterus (-)pallor  CV: N S1 S2 1/6 MARYANNE (+)2 Pulses B/l  Resp:  Clear to ausculatation B/L, normal effort  GI: (+) BS Soft, NT, ND  Lymph:  (-)Edema, (-)obvious lymphadenopathy  Skin: Warm to touch, Normal turgor  Psych: Appropriate mood and affect      TELEMETRY: 	  sinus, sinus tach      ASSESSMENT/PLAN: 	60y  Male PMHx of multiple sclerosis and no significant PSHx presents to the ED  after experiencing an episode of LOC ? seizure at his neurologist office.    - echo with no pertinent findings  - stress test with no ischemia.  Would have expected a more robust decrease in QT with higher heart rates.  - Avoid QT prolonging drugs  - Outpt genetic testing  - EP eval appreciated  - f/u with Dr. Gudino in 1-2 weeks    Edmond Wagner MD, [facility]  BEEPER [phone]       Patient denies chest pain or shortness of breath.   Review of systems otherwise (-)  	  MEDICATIONS:  MEDICATIONS  (STANDING):  aspirin  chewable 162 milliGRAM(s) Oral daily  calcitriol   Capsule 0.5 MICROGram(s) Oral daily  folic acid 1 milliGRAM(s) Oral daily  heparin   Injectable 5000 Unit(s) SubCutaneous every 12 hours  ondansetron Injectable 8 milliGRAM(s) IV Push two times a day  pantoprazole    Tablet 40 milliGRAM(s) Oral before breakfast  sodium chloride 0.9%. 1000 milliLiter(s) (80 mL/Hr) IV Continuous <Continuous>  thiamine Injectable 100 milliGRAM(s) IntraMuscular once  thiamine IVPB 500 milliGRAM(s) IV Intermittent every 8 hours      LABS:	 	    CARDIAC MARKERS:  CARDIAC MARKERS ( 25 Jan 2021 17:59 )  <0.015 ng/mL / x     / x     / x     / x      CARDIAC MARKERS ( 25 Jan 2021 14:26 )  <0.015 ng/mL / x     / x     / x     / x      CARDIAC MARKERS ( 25 Jan 2021 13:10 )  <0.015 ng/mL / x     / x     / x     / x                                    10.4   5.94  )-----------( 68       ( 27 Jan 2021 07:50 )             32.4     Hemoglobin: 10.4 g/dL (01-27 @ 07:50)  Hemoglobin: 11.6 g/dL (01-25 @ 13:10)      01-27    140  |  107  |  9   ----------------------------<  98  3.9   |  26  |  1.29    Ca    7.0<L>      27 Jan 2021 07:50  Phos  3.1     01-27  Mg     1.4     01-27    TPro  5.9<L>  /  Alb  2.6<L>  /  TBili  1.4<H>  /  DBili  x   /  AST  22  /  ALT  18  /  AlkPhos  98  01-26    Creatinine Trend: 1.29<--, 1.28<--, 1.53<--, 1.67<--      PHYSICAL EXAM:  T(C): 36.6 (01-27-21 @ 11:52), Max: 37.2 (01-26-21 @ 23:25)  HR: 104 (01-27-21 @ 11:52) (70 - 107)  BP: 138/69 (01-27-21 @ 11:52) (122/75 - 146/78)  RR: 18 (01-27-21 @ 11:52) (17 - 20)  SpO2: 100% (01-27-21 @ 11:52) (96% - 100%)  Wt(kg): --  I&O's Summary      HEENT:  (-)icterus (-)pallor  CV: N S1 S2 1/6 MARYANNE (+)2 Pulses B/l  Resp:  Clear to ausculatation B/L, normal effort  GI: (+) BS Soft, NT, ND  Lymph:  (-)Edema, (-)obvious lymphadenopathy  Skin: Warm to touch, Normal turgor  Psych: Appropriate mood and affect      TELEMETRY: 	  sinus, sinus tach      ASSESSMENT/PLAN: 	60y  Male PMHx of multiple sclerosis and no significant PSHx presents to the ED  after experiencing an episode of LOC ? seizure at his neurologist office.    - echo with no pertinent findings  - stress test with no ischemia.  Would have expected a more robust decrease in QT with higher heart rates.  - Avoid QT prolonging drugs  - Outpt genetic testing  - Start Toprol XL 25 QD empirically  - EP eval appreciated  - f/u with Dr. Gudino in 1-2 weeks    Edmond Wagner MD, [facility]  BEEPER [phone]

## 2021-01-27 NOTE — PROGRESS NOTE ADULT - PROBLEM SELECTOR PLAN 5
Pt p/w wbc 13, hr 138, rr 20, lactate 3.5, no apparent source meets 3/4 criterion of sirs  covid neg  s/p zosyn in ed  will give prophylactic antibioitcs till bcx and ucx are back   ceftriaxone and azithro  f/u procal -Pt p/w 3.2  replaced  -Resolved

## 2021-01-27 NOTE — PROGRESS NOTE ADULT - PROBLEM SELECTOR PLAN 8
Pt has hx of MS   on no active treatment -pt last drink last night 1/24  -usually drinks 3-4 bear per day   -will start ciwa protocol  -thiamin iv  -ativan prn  -F/U BAL

## 2021-01-27 NOTE — DISCHARGE NOTE PROVIDER - NSDCMRMEDTOKEN_GEN_ALL_CORE_FT
LISINOPRIL 2.5 MG TABLET:   SIMVASTATIN 10 MG TABLET:    calcitriol 0.5 mcg oral capsule: 1 cap(s) orally once a day   ferrous sulfate 200 mg (65 mg elemental iron) oral tablet: 1 tab(s) orally once a day   magnesium oxide 400 mg (241.3 mg elemental magnesium) oral tablet: 1 tab(s) orally 2 times a day   SIMVASTATIN 10 MG TABLET:   Toprol-XL 25 mg oral tablet, extended release: 1 tab(s) orally once a day

## 2021-01-27 NOTE — PROGRESS NOTE ADULT - PROBLEM SELECTOR PLAN 7
pt last drink last night 1/24  usually drinks 3-4 bear per day   will start ciwa protocol  thiamin iv  ativan prn -Aggressively replaced   -Urine magnesium 14.2 -Aggressively replaced   -Urine magnesium 14.2  -Magnesium 1.4 , replaced with 2gm -Pt p/w wbc 13, hr 138, rr 20, lactate 3.5, no apparent source meets 3/4 criterion of sirs  -COVID neg  -s/p zosyn in ed  -BCx negative  -Will dc antibiotics

## 2021-01-27 NOTE — PROGRESS NOTE ADULT - PROBLEM SELECTOR PLAN 9
RISK                                                          Points  [] Previous VTE                                           3  [] Thrombophilia                                        2  [] Lower limb paralysis                              2   [] Current Cancer                                       2   [x] Immobilization > 24 hrs                        1  [] ICU/CCU stay > 24 hours                       1  [x] Age > 60                                                   1    sc heparin -Pt has hx of MS   -on no active treatment

## 2021-01-27 NOTE — CONSULT NOTE ADULT - SUBJECTIVE AND OBJECTIVE BOX
[  ] STAT REQUEST              [  ]ROUTINE REQUEST    Patient is a 60y old  Male who presents with a chief complaint of seizure(?)/syncopy (2021 11:12)      HPI:  60 year old male from home with PMHx of multiple sclerosis and no significant PSHx presents to the ED  after experiencing an episode of seizure at his neurologist office. Patient reports that he has not had an MRI done in the past twenty-five years related to his MS and that he is not currently on medication for his condition, thus prompting him to go see his neurologist at [place] Dr[facility] Rizo. Patient endorses that just prior to arrival today he experienced loss of consciousness while in the examination room of his doctor's examination room. Patient states that during the episode he was generally shaking, with his eyes open and rolling back with a right-sided gaze deviation. Patient reports that the entire episode lasted for approximately two to three minutes, after which he returned to his baseline spontaneously with some postictal confusion as per dr office. Patient was then escorted to the ED via EMS immediately. In the ED, patient denies any associated chest pain, shortness of breath, sweatiness, vomiting, and any other symptoms. Pt also has complaints of severe post nasal drip over the past few days. Patient reports that his symptoms are caused by a decrease in po intake, which he states has caused him to become very dehydrated. Patient endorses that he has been taking Benadryl 24 grams q4h at home in order to attempt to dry up the post nasal drip. Patient additionally complains of some mild dizziness. Pt endorsed diarrhoea for last 3 days and has been coughing up and vomiting mucous that is dripping from back of his throat. (2021 17:08)      PAIN MANAGEMENT:  Pain Scale:                 /10  Pain Location:      Prior Colonoscopy:  No prior colonoscopy    PAST MEDICAL HISTORY  MS (multiple sclerosis)        PAST SURGICAL HISTORY  No significant past surgical history        Allergies    Keflex (Other)    Intolerances  None         MEDICATIONS  (STANDING):  aspirin  chewable 162 milliGRAM(s) Oral daily  calcitriol   Capsule 0.5 MICROGram(s) Oral daily  folic acid 1 milliGRAM(s) Oral daily  heparin   Injectable 5000 Unit(s) SubCutaneous every 12 hours  magnesium sulfate  IVPB 2 Gram(s) IV Intermittent once  ondansetron Injectable 8 milliGRAM(s) IV Push two times a day  pantoprazole    Tablet 40 milliGRAM(s) Oral before breakfast  sodium chloride 0.9%. 1000 milliLiter(s) (80 mL/Hr) IV Continuous <Continuous>  thiamine Injectable 100 milliGRAM(s) IntraMuscular once  thiamine IVPB 500 milliGRAM(s) IV Intermittent every 8 hours    MEDICATIONS  (PRN):  acetaminophen   Tablet .. 650 milliGRAM(s) Oral every 6 hours PRN Temp greater or equal to 38C (100.4F), Mild Pain (1 - 3)  HYDROmorphone  Injectable 0.5 milliGRAM(s) IV Push every 6 hours PRN Severe Pain (7 - 10)  LORazepam   Injectable 2 milliGRAM(s) IV Push every 4 hours PRN CIWA-Ar score 8 or greater      SOCIAL HISTORY  Advanced Directives:       [ X Full Code       [  ] DNR  Marital Status:         [  ] M      [ X ] S      [  ] D       [  ] W  Children:       [ X ] Yes      [  ] No  Occupation:        [  ] Employed       [ X ] Unemployed       [  ] Retired  Diet:       [ X ] Regular       [  ] PEG feeding          [  ] NG tube feeding  Drug Use:           [ X] Patient denied          [  ] Yes  Alcohol:           [ X ] No             [  ] Yes (socially)         [  ] Yes (chronic)  Tobacco:           [  ] Yes           [ X ] No      FAMILY HISTORY  [ X ] Heart Disease            [ X ] Diabetes             [ X ] HTN             [  ] Colon Cancer             [  ] Stomach Cancer              [  ] Pancreatic Cancer      VITAL SIGNS   Vital Signs Last 24 Hrs  T(C): 36.6 (2021 07:19), Max: 37.2 (2021 23:25)  T(F): 97.9 (2021 07:19), Max: 99 (2021 23:25)  HR: 92 (2021 07:19) (70 - 107)  BP: 128/76 (2021 07:19) (116/69 - 146/78)   RR: 18 (2021 07:19) (17 - 20)  SpO2: 98% (2021 07:19) (96% - 100%)  Daily Weight in k.6 (2021 04:36)         CBC Full  -  ( 2021 07:50 )  WBC Count : 5.94 K/uL  RBC Count : 3.47 M/uL  Hemoglobin : 10.4 g/dL  Hematocrit : 32.4 %  Platelet Count - Automated : 68 K/uL  Mean Cell Volume : 93.4 fl  Mean Cell Hemoglobin : 30.0 pg  Mean Cell Hemoglobin Concentration : 32.1 gm/dL  Auto Neutrophil # : x  Auto Lymphocyte # : x  Auto Monocyte # : x  Auto Eosinophil # : x  Auto Basophil # : x  Auto Neutrophil % : x  Auto Lymphocyte % : x  Auto Monocyte % : x  Auto Eosinophil % : x  Auto Basophil % : x          140  |  107  |  9   ----------------------------<  98  3.9   |  26  |  1.29    Ca    7.0<L>      2021 07:50  Phos  3.1       Mg     1.4         TPro  5.9<L>  /  Alb  2.6<L>  /  TBili  1.4<H>  /  DBili  x   /  AST  22  /  ALT  18  /  AlkPhos  98       PT/INR - ( 2021 13:10 )   PT: 14.3 sec;   INR: 1.21 ratio       PTT - ( 2021 13:10 )  PTT:30.4 sec    Iron with Total Binding Capacity (21 @ 17:59)   % Saturation, Iron: 15 %   Iron - Total Binding Capacity.: 199 ug/dL   Iron Total, Serum: 29 ug/dL   Unsaturated Iron Binding Capacity: 170 ug/dL       ECG  Ventricular Rate 108 BPM    Atrial Rate 108 BPM    P-R Interval 128 ms    QRS Duration 84 ms    Q-T Interval 354 ms    QTC Calculation(Bazett) 474 ms    P Axis 30 degrees    R Axis 51 degrees    T Axis -8 degrees    Diagnosis Line Sinus tachycardia  T wave abnormality, consider anterior ischemia  Abnormal ECG        RADIOLOGY/IMAGING                EXAM:  CT ANGIO CHEST (W)AW IC                            PROCEDURE DATE:  2021          INTERPRETATION:  CLINICAL INFORMATION: Loss of consciousness, acute seizure, systemic inflammatory response syndrome, alcohol abuse, multiple sclerosis    COMPARISON: No similar prior    PROCEDURE:  CT Angiography of the Chest.  60 ml of Omnipaque 350 was injected intravenously. 40 ml were discarded.  Sagittal and coronal reformats were performed as well as 3D (MIP) reconstructions.    FINDINGS:    LUNGS AND AIRWAYS: Patent central airways.  Lungs are clear.  PLEURA: No pleural effusion or pneumothorax.  MEDIASTINUM AND JOEL: No hemorrhage or enlarged lymph node measuring greater than 10 mm in short axis.  VESSELS: Aorta is normal in caliber without dissection. Main pulmonary artery is normal in caliber. Pulmonary arterial system is opacified to the segmental level without acute thromboembolus.  HEART: Heart size is normal. No pericardial effusion.  CHEST WALL AND LOWER NECK: Within normal limits.  VISUALIZED UPPER ABDOMEN: Hepatic steatosis. Mild splenomegaly.  BONES: Compression fracture of T4, favored to be chronic.    IMPRESSION:    1. No pulmonary embolus or other acute cardiopulmonary abnormality.  2. Hepatic steatosis and splenomegaly.               [  ] STAT REQUEST              [ X ] ROUTINE REQUEST    Patient is a 60 year old male with iron deficiency anemia. GI consulted to evaluate.         HPI:  60 year old male from home with past medical history significant for multiple sclerosis admitted with syncope. Pt also has complaints of severe post nasal drip over the past few days associated with persistent nausea and vomiting after swallowing mucous and post nasal drips. Patient also reports long history of worsening constipation associated with bloating and intermittent rectal bleeding with straining. Patient denies hematemesis, melena, epistaxis, hemoptysis, fever, chills, chest pain, SOB, cough, hematuria, dysuria or diarrhea.        PAIN MANAGEMENT:  Pain Scale:                0 /10  Pain Location:      Prior Colonoscopy:  No prior colonoscopy    PAST MEDICAL HISTORY  MS (multiple sclerosis)        PAST SURGICAL HISTORY  No significant past surgical history        Allergies    Keflex (Other)    Intolerances  None         MEDICATIONS  (STANDING):  aspirin  chewable 162 milliGRAM(s) Oral daily  calcitriol   Capsule 0.5 MICROGram(s) Oral daily  folic acid 1 milliGRAM(s) Oral daily  heparin   Injectable 5000 Unit(s) SubCutaneous every 12 hours  magnesium sulfate  IVPB 2 Gram(s) IV Intermittent once  ondansetron Injectable 8 milliGRAM(s) IV Push two times a day  pantoprazole    Tablet 40 milliGRAM(s) Oral before breakfast  sodium chloride 0.9%. 1000 milliLiter(s) (80 mL/Hr) IV Continuous <Continuous>  thiamine Injectable 100 milliGRAM(s) IntraMuscular once  thiamine IVPB 500 milliGRAM(s) IV Intermittent every 8 hours    MEDICATIONS  (PRN):  acetaminophen   Tablet .. 650 milliGRAM(s) Oral every 6 hours PRN Temp greater or equal to 38C (100.4F), Mild Pain (1 - 3)  HYDROmorphone  Injectable 0.5 milliGRAM(s) IV Push every 6 hours PRN Severe Pain (7 - 10)  LORazepam   Injectable 2 milliGRAM(s) IV Push every 4 hours PRN CIWA-Ar score 8 or greater      SOCIAL HISTORY  Advanced Directives:       [ X Full Code       [  ] DNR  Marital Status:         [  ] M      [ X ] S      [  ] D       [  ] W  Children:       [ X ] Yes      [  ] No  Occupation:        [  ] Employed       [ X ] Unemployed       [  ] Retired  Diet:       [ X ] Regular       [  ] PEG feeding          [  ] NG tube feeding  Drug Use:           [ X] Patient denied          [  ] Yes  Alcohol:           [ X ] No             [  ] Yes (socially)         [  ] Yes (chronic)  Tobacco:           [  ] Yes           [ X ] No      FAMILY HISTORY  [ X ] Heart Disease            [ X ] Diabetes             [ X ] HTN             [  ] Colon Cancer             [  ] Stomach Cancer              [  ] Pancreatic Cancer      VITAL SIGNS   Vital Signs Last 24 Hrs  T(C): 36.6 (2021 07:19), Max: 37.2 (2021 23:25)  T(F): 97.9 (2021 07:19), Max: 99 (2021 23:25)  HR: 92 (2021 07:19) (70 - 107)  BP: 128/76 (2021 07:19) (116/69 - 146/78)   RR: 18 (2021 07:19) (17 - 20)  SpO2: 98% (2021 07:19) (96% - 100%)  Daily Weight in k.6 (2021 04:36)         CBC Full  -  ( 2021 07:50 )  WBC Count : 5.94 K/uL  RBC Count : 3.47 M/uL  Hemoglobin : 10.4 g/dL  Hematocrit : 32.4 %  Platelet Count - Automated : 68 K/uL  Mean Cell Volume : 93.4 fl  Mean Cell Hemoglobin : 30.0 pg  Mean Cell Hemoglobin Concentration : 32.1 gm/dL  Auto Neutrophil # : x  Auto Lymphocyte # : x  Auto Monocyte # : x  Auto Eosinophil # : x  Auto Basophil # : x  Auto Neutrophil % : x  Auto Lymphocyte % : x  Auto Monocyte % : x  Auto Eosinophil % : x  Auto Basophil % : x          140  |  107  |  9   ----------------------------<  98  3.9   |  26  |  1.29    Ca    7.0<L>      2021 07:50  Phos  3.1     -  Mg     1.4     -    TPro  5.9<L>  /  Alb  2.6<L>  /  TBili  1.4<H>  /  DBili  x   /  AST  22  /  ALT  18  /  AlkPhos  98  01-26     PT/INR - ( 2021 13:10 )   PT: 14.3 sec;   INR: 1.21 ratio       PTT - ( 2021 13:10 )  PTT:30.4 sec    Iron with Total Binding Capacity (21 @ 17:59)   % Saturation, Iron: 15 %   Iron - Total Binding Capacity.: 199 ug/dL   Iron Total, Serum: 29 ug/dL   Unsaturated Iron Binding Capacity: 170 ug/dL       ECG  Ventricular Rate 108 BPM    Atrial Rate 108 BPM    P-R Interval 128 ms    QRS Duration 84 ms    Q-T Interval 354 ms    QTC Calculation(Bazett) 474 ms    P Axis 30 degrees    R Axis 51 degrees    T Axis -8 degrees    Diagnosis Line Sinus tachycardia  T wave abnormality, consider anterior ischemia  Abnormal ECG        RADIOLOGY/IMAGING                EXAM:  CT ANGIO CHEST (W)AW IC                            PROCEDURE DATE:  2021          INTERPRETATION:  CLINICAL INFORMATION: Loss of consciousness, acute seizure, systemic inflammatory response syndrome, alcohol abuse, multiple sclerosis    COMPARISON: No similar prior    PROCEDURE:  CT Angiography of the Chest.  60 ml of Omnipaque 350 was injected intravenously. 40 ml were discarded.  Sagittal and coronal reformats were performed as well as 3D (MIP) reconstructions.    FINDINGS:    LUNGS AND AIRWAYS: Patent central airways.  Lungs are clear.  PLEURA: No pleural effusion or pneumothorax.  MEDIASTINUM AND JOEL: No hemorrhage or enlarged lymph node measuring greater than 10 mm in short axis.  VESSELS: Aorta is normal in caliber without dissection. Main pulmonary artery is normal in caliber. Pulmonary arterial system is opacified to the segmental level without acute thromboembolus.  HEART: Heart size is normal. No pericardial effusion.  CHEST WALL AND LOWER NECK: Within normal limits.  VISUALIZED UPPER ABDOMEN: Hepatic steatosis. Mild splenomegaly.  BONES: Compression fracture of T4, favored to be chronic.    IMPRESSION:    1. No pulmonary embolus or other acute cardiopulmonary abnormality.  2. Hepatic steatosis and splenomegaly.

## 2021-01-27 NOTE — PROGRESS NOTE ADULT - PROBLEM SELECTOR PLAN 2
pt p/w cr 1.6  f/u urine lytes  if cr elevates can get renal us -Pt with GI symptoms new onset such as nausea , Mucoid stool  -Anemic . low iron with low TIBC.   -Hgb dropped from 11 to 10 in one day   -F/U CEA levels  -Dr Clau AVILA Consulted

## 2021-01-27 NOTE — PROGRESS NOTE ADULT - SUBJECTIVE AND OBJECTIVE BOX
PGY-1 Progress Note discussed with attending    PAGER #: [[phone]] TILL 5:00 PM  PLEASE CONTACT ON CALL TEAM:  - On Call Team (Please refer to Kinjal) FROM 5:00 PM - 8:30PM  - Nightfloat Team FROM 8:30 -7:30 AM    CHIEF COMPLAINT & BRIEF HOSPITAL COURSE:    INTERVAL HPI/OVERNIGHT EVENTS:       REVIEW OF SYSTEMS:  CONSTITUTIONAL: No fever, weight loss, or fatigue  RESPIRATORY: No cough, wheezing, chills or hemoptysis; No shortness of breath  CARDIOVASCULAR: No chest pain, palpitations, dizziness, or leg swelling  GASTROINTESTINAL: No abdominal pain. No nausea, vomiting, or hematemesis; No diarrhea or constipation. No melena or hematochezia.  GENITOURINARY: No dysuria or hematuria, urinary frequency  NEUROLOGICAL: No headaches, memory loss, loss of strength, numbness, or tremors  SKIN: No itching, burning, rashes, or lesions     Vital Signs Last 24 Hrs  T(C): 36.6 (27 Jan 2021 07:19), Max: 37.2 (26 Jan 2021 23:25)  T(F): 97.9 (27 Jan 2021 07:19), Max: 99 (26 Jan 2021 23:25)  HR: 92 (27 Jan 2021 07:19) (70 - 107)  BP: 128/76 (27 Jan 2021 07:19) (116/69 - 146/78)  BP(mean): --  RR: 18 (27 Jan 2021 07:19) (17 - 20)  SpO2: 98% (27 Jan 2021 07:19) (96% - 100%)    PHYSICAL EXAMINATION:  GENERAL: NAD, well built  HEAD:  Atraumatic, Normocephalic  EYES:  conjunctiva and sclera clear  NECK: Supple, No JVD, Normal thyroid  CHEST/LUNG: Clear to auscultation. Clear to percussion bilaterally; No rales, rhonchi, wheezing, or rubs  HEART: Regular rate and rhythm; No murmurs, rubs, or gallops  ABDOMEN: Soft, Nontender, Nondistended; Bowel sounds present  NERVOUS SYSTEM:  Alert & Oriented X3,    EXTREMITIES:  2+ Peripheral Pulses, No clubbing, cyanosis, or edema  SKIN: warm dry                          10.4   5.94  )-----------( x        ( 27 Jan 2021 07:50 )             32.4     01-27    140  |  107  |  9   ----------------------------<  98  3.9   |  26  |  1.29    Ca    7.0<L>      27 Jan 2021 07:50  Phos  3.1     01-27  Mg     1.4     01-27    TPro  5.9<L>  /  Alb  2.6<L>  /  TBili  1.4<H>  /  DBili  x   /  AST  22  /  ALT  18  /  AlkPhos  98  01-26    LIVER FUNCTIONS - ( 26 Jan 2021 07:06 )  Alb: 2.6 g/dL / Pro: 5.9 g/dL / ALK PHOS: 98 U/L / ALT: 18 U/L DA / AST: 22 U/L / GGT: x           CARDIAC MARKERS ( 25 Jan 2021 17:59 )  <0.015 ng/mL / x     / x     / x     / x      CARDIAC MARKERS ( 25 Jan 2021 14:26 )  <0.015 ng/mL / x     / x     / x     / x      CARDIAC MARKERS ( 25 Jan 2021 13:10 )  <0.015 ng/mL / x     / x     / x     / x          PT/INR - ( 25 Jan 2021 13:10 )   PT: 14.3 sec;   INR: 1.21 ratio         PTT - ( 25 Jan 2021 13:10 )  PTT:30.4 sec    CAPILLARY BLOOD GLUCOSE      RADIOLOGY & ADDITIONAL TESTS:                   PGY-1 Progress Note discussed with attending    PAGER #: [[phone]] TILL 5:00 PM  PLEASE CONTACT ON CALL TEAM:  - On Call Team (Please refer to Kinjal) FROM 5:00 PM - 8:30PM  - Nightfloat Team FROM 8:30 -7:30 AM    CHIEF COMPLAINT & BRIEF HOSPITAL COURSE:  60 year old male from home with PMHx of multiple sclerosis and no significant PSHx presents to the ED  after experiencing an episode of seizure at his neurologist office. Patient reports that he has not had an MRI done in the past twenty-five years related to his MS and that he is not currently on medication for his condition, thus prompting him to go see his neurologist at [place] Dr. Rizo. CTH neg. EEG was unremarkable for any seizure activity. As per Neuro likely syncopy seizure vs seizure due to combined effects of electrolyte abnormalities and EtOH withdrawal state as pt was hypomagnesemic.  hypokalemic and hypocalcemic. No need for anticonvulsant medication. Pt with prolonged QTC . Will go for stress test today     INTERVAL HPI/ OVERNIGHT EVENTS: No active event overnight. Pt was found to be restless and frustrated in the morning. He also had argument with the staff that why he is going for the stress test and he doesn't have shoes and wallet . He already called his friend to come and pick him up at 8 am.      INTERVAL HPI/OVERNIGHT EVENTS:       REVIEW OF SYSTEMS:  CONSTITUTIONAL: No fever, weight loss, or fatigue  RESPIRATORY: No cough, wheezing, chills or hemoptysis; No shortness of breath  CARDIOVASCULAR: No chest pain, palpitations, dizziness, or leg swelling  GASTROINTESTINAL: No abdominal pain. No nausea, vomiting, or hematemesis; No diarrhea or constipation. No melena or hematochezia.  GENITOURINARY: No dysuria or hematuria, urinary frequency  NEUROLOGICAL: No headaches, memory loss, loss of strength, numbness, or tremors  SKIN: No itching, burning, rashes, or lesions     Vital Signs Last 24 Hrs  T(C): 36.6 (27 Jan 2021 07:19), Max: 37.2 (26 Jan 2021 23:25)  T(F): 97.9 (27 Jan 2021 07:19), Max: 99 (26 Jan 2021 23:25)  HR: 92 (27 Jan 2021 07:19) (70 - 107)  BP: 128/76 (27 Jan 2021 07:19) (116/69 - 146/78)  BP(mean): --  RR: 18 (27 Jan 2021 07:19) (17 - 20)  SpO2: 98% (27 Jan 2021 07:19) (96% - 100%)    PHYSICAL EXAMINATION:  GENERAL: NAD, well built  HEAD:  Atraumatic, Normocephalic  EYES:  conjunctiva and sclera clear  NECK: Supple, No JVD, Normal thyroid  CHEST/LUNG: Clear to auscultation. Clear to percussion bilaterally; No rales, rhonchi, wheezing, or rubs  HEART: Regular rate and rhythm; No murmurs, rubs, or gallops  ABDOMEN: Soft, Nontender, Nondistended; Bowel sounds present  NERVOUS SYSTEM:  Alert & Oriented X3,    EXTREMITIES:  2+ Peripheral Pulses, No clubbing, cyanosis, or edema  SKIN: warm dry                          10.4   5.94  )-----------( x        ( 27 Jan 2021 07:50 )             32.4     01-27    140  |  107  |  9   ----------------------------<  98  3.9   |  26  |  1.29    Ca    7.0<L>      27 Jan 2021 07:50  Phos  3.1     01-27  Mg     1.4     01-27    TPro  5.9<L>  /  Alb  2.6<L>  /  TBili  1.4<H>  /  DBili  x   /  AST  22  /  ALT  18  /  AlkPhos  98  01-26    LIVER FUNCTIONS - ( 26 Jan 2021 07:06 )  Alb: 2.6 g/dL / Pro: 5.9 g/dL / ALK PHOS: 98 U/L / ALT: 18 U/L DA / AST: 22 U/L / GGT: x           CARDIAC MARKERS ( 25 Jan 2021 17:59 )  <0.015 ng/mL / x     / x     / x     / x      CARDIAC MARKERS ( 25 Jan 2021 14:26 )  <0.015 ng/mL / x     / x     / x     / x      CARDIAC MARKERS ( 25 Jan 2021 13:10 )  <0.015 ng/mL / x     / x     / x     / x          PT/INR - ( 25 Jan 2021 13:10 )   PT: 14.3 sec;   INR: 1.21 ratio         PTT - ( 25 Jan 2021 13:10 )  PTT:30.4 sec    CAPILLARY BLOOD GLUCOSE      RADIOLOGY & ADDITIONAL TESTS:                   PGY-1 Progress Note discussed with attending    PAGER #: [[phone]] TILL 5:00 PM  PLEASE CONTACT ON CALL TEAM:  - On Call Team (Please refer to Kinjal) FROM 5:00 PM - 8:30PM  - Nightfloat Team FROM 8:30 -7:30 AM    CHIEF COMPLAINT & BRIEF HOSPITAL COURSE:  60 year old male from home with PMHx of multiple sclerosis and no significant PSHx presents to the ED  after experiencing an episode of seizure at his neurologist office. Patient reports that he has not had an MRI done in the past twenty-five years related to his MS and that he is not currently on medication for his condition, thus prompting him to go see his neurologist at [facility] Dr. Rizo. CTH neg. EEG was unremarkable for any seizure activity. As per Neuro likely syncopy seizure vs seizure due to combined effects of electrolyte abnormalities and EtOH withdrawal state as pt was hypomagnesemic.  hypokalemic and hypocalcemic. No need for anticonvulsant medication. Pt with prolonged QTC . Will go for stress test today     INTERVAL HPI/ OVERNIGHT EVENTS: No active event overnight. Pt was found to be restless and frustrated in the morning. He also had argument with the staff that why he is going for the stress test. he doesn't have shoes and wallet, how he would be going home without shoes . He already called his friend to come and pick him up at 8 am. Case discussed with Dr Ny over the phone. Pt was explained that he would be going for the stress test due to concerns on EKG. Dr Key stopped by and explained him again. Pt with some GI symptoms such as nausea, mucoid stool as well as anemic . Hemoglobin dropped from 11 to 10 in one day. Will consult GI. Dr Olguin. Will do FOBT and check CEA levels     REVIEW OF SYSTEMS:  CONSTITUTIONAL: No fever, weight loss, or fatigue  RESPIRATORY: No cough, wheezing, chills or hemoptysis; No shortness of breath  CARDIOVASCULAR: No chest pain, palpitations, dizziness, or leg swelling  GASTROINTESTINAL: No abdominal pain.  nausea +ve, vomiting, or hematemesis; No diarrhea or constipation. No melena or hematochezia.  GENITOURINARY: No dysuria or hematuria, urinary frequency  NEUROLOGICAL: No headaches, memory loss, loss of strength, numbness, or tremors  SKIN: No itching, burning, rashes, or lesions     Vital Signs Last 24 Hrs  T(C): 36.6 (27 Jan 2021 07:19), Max: 37.2 (26 Jan 2021 23:25)  T(F): 97.9 (27 Jan 2021 07:19), Max: 99 (26 Jan 2021 23:25)  HR: 92 (27 Jan 2021 07:19) (70 - 107)  BP: 128/76 (27 Jan 2021 07:19) (116/69 - 146/78)  BP(mean): --  RR: 18 (27 Jan 2021 07:19) (17 - 20)  SpO2: 98% (27 Jan 2021 07:19) (96% - 100%)    PHYSICAL EXAMINATION:  GENERAL: NAD, anxious, restless, frustrated being in the hospital   HEAD:  Atraumatic, Normocephalic  EYES:  conjunctiva and sclera clear  NECK: Supple, No JVD, Normal thyroid  CHEST/LUNG: Clear to auscultation. Clear to percussion bilaterally; No rales, rhonchi, wheezing, or rubs  HEART: Regular rate and rhythm; No murmurs, rubs, or gallops  ABDOMEN: Soft, Nontender, Nondistended; Bowel sounds present  NERVOUS SYSTEM:  Alert & Oriented X3,    EXTREMITIES:  2+ Peripheral Pulses, No clubbing, cyanosis, or edema  SKIN: warm dry    MEDICATIONS  (STANDING):  aspirin  chewable 162 milliGRAM(s) Oral daily  calcitriol   Capsule 0.5 MICROGram(s) Oral daily  folic acid 1 milliGRAM(s) Oral daily  heparin   Injectable 5000 Unit(s) SubCutaneous every 12 hours  magnesium sulfate  IVPB 2 Gram(s) IV Intermittent once  ondansetron Injectable 8 milliGRAM(s) IV Push two times a day  pantoprazole    Tablet 40 milliGRAM(s) Oral before breakfast  sodium chloride 0.9%. 1000 milliLiter(s) (80 mL/Hr) IV Continuous <Continuous>  thiamine Injectable 100 milliGRAM(s) IntraMuscular once  thiamine IVPB 500 milliGRAM(s) IV Intermittent every 8 hours    MEDICATIONS  (PRN):  acetaminophen   Tablet .. 650 milliGRAM(s) Oral every 6 hours PRN Temp greater or equal to 38C (100.4F), Mild Pain (1 - 3)  HYDROmorphone  Injectable 0.5 milliGRAM(s) IV Push every 6 hours PRN Severe Pain (7 - 10)  LORazepam   Injectable 2 milliGRAM(s) IV Push every 4 hours PRN CIWA-Ar score 8 or greater                            10.4   5.94  )-----------( x        ( 27 Jan 2021 07:50 )             32.4     01-27    140  |  107  |  9   ----------------------------<  98  3.9   |  26  |  1.29    Ca    7.0<L>      27 Jan 2021 07:50  Phos  3.1     01-27  Mg     1.4     01-27    TPro  5.9<L>  /  Alb  2.6<L>  /  TBili  1.4<H>  /  DBili  x   /  AST  22  /  ALT  18  /  AlkPhos  98  01-26    LIVER FUNCTIONS - ( 26 Jan 2021 07:06 )  Alb: 2.6 g/dL / Pro: 5.9 g/dL / ALK PHOS: 98 U/L / ALT: 18 U/L DA / AST: 22 U/L / GGT: x           CARDIAC MARKERS ( 25 Jan 2021 17:59 )  <0.015 ng/mL / x     / x     / x     / x      CARDIAC MARKERS ( 25 Jan 2021 14:26 )  <0.015 ng/mL / x     / x     / x     / x      CARDIAC MARKERS ( 25 Jan 2021 13:10 )  <0.015 ng/mL / x     / x     / x     / x          PT/INR - ( 25 Jan 2021 13:10 )   PT: 14.3 sec;   INR: 1.21 ratio         PTT - ( 25 Jan 2021 13:10 )  PTT:30.4 sec    CAPILLARY BLOOD GLUCOSE      RADIOLOGY & ADDITIONAL TESTS:

## 2021-01-27 NOTE — DISCHARGE NOTE PROVIDER - CARE PROVIDER_API CALL
[facility]  INTERNAL MEDICINE  [address] [place] [address]  Phone: [phone]  Fax: [phone]  Follow Up Time:     Daniel [place])  [place] [address]  Phone: [phone]  Fax: [phone]  Follow Up Time:     Cardiologist Dr Gudino,   Phone: (   )    -  Fax: (   )    -  Follow Up Time:

## 2021-01-27 NOTE — PROGRESS NOTE ADULT - PROBLEM SELECTOR PLAN 1
- p/w episode of witnessed seizure atdr office (Tonic clonic episode), with fall on the floor  - Followed by some post-ictal confusion   - No focal weakness, CN 2-12 intact  - pt has hx of MS  - Afebrile, mild WBC elevation, negative UA, and CT head unremarkable  - MILD electrolytes IMBALANCE and renal functions SHOW SOME ALICE  - Lactate 3.5  - f/u BAL , Salicylates and Tylenol  , Utox   - EKG NSR  - CT head : unremarkable CT study of the brain. No acute abnormality suggested.  - Aspiration precaution/Seizure precaution/Fall precautions   - f/u Orthostatics   - f/u Vitamin B12 & Folate  - f/u  ammonia level  s/p phentanyl in ed  - f/u PT  -f/u eeg  -f/u ct angio for r/o pe  ** Neurology consulted Dr. MANNING - p/w episode of witnessed seizure atdr office (Tonic clonic episode), with fall on the floor  - Followed by some post-ictal confusion   - No focal weakness, CN 2-12 intact  - pt has hx of MS  - Afebrile, mild WBC elevation, negative UA, and CT head unremarkable  - MILD electrolytes IMBALANCE and renal functions SHOW SOME ALICE  - Lactate 3.5  - Utox positive for Benzos and Opiates   - EKG NSR  - CT head : unremarkable CT study of the brain. No acute abnormality suggested.  - Aspiration precaution/Seizure precaution/Fall precautions   - Orthostatics neg  - f/u  ammonia level, BAL level , Vitamin B12 , folate abd Salicylate level. Were not send on admission.  -s/p phentanyl in ed  - f/u PT  -f/u eeg  -f/u ct angio for r/o pe  ** Neurology consulted Dr. MANNING

## 2021-01-28 ENCOUNTER — TRANSCRIPTION ENCOUNTER (OUTPATIENT)
Age: 61
End: 2021-01-28

## 2021-01-28 VITALS
OXYGEN SATURATION: 97 % | DIASTOLIC BLOOD PRESSURE: 77 MMHG | TEMPERATURE: 98 F | RESPIRATION RATE: 18 BRPM | HEART RATE: 66 BPM | SYSTOLIC BLOOD PRESSURE: 146 MMHG

## 2021-01-28 LAB
ALBUMIN SERPL ELPH-MCNC: 2.7 G/DL — LOW (ref 3.5–5)
ALP SERPL-CCNC: 120 U/L — SIGNIFICANT CHANGE UP (ref 40–120)
ALT FLD-CCNC: 16 U/L DA — SIGNIFICANT CHANGE UP (ref 10–60)
ANION GAP SERPL CALC-SCNC: 7 MMOL/L — SIGNIFICANT CHANGE UP (ref 5–17)
AST SERPL-CCNC: 19 U/L — SIGNIFICANT CHANGE UP (ref 10–40)
BILIRUB SERPL-MCNC: 0.9 MG/DL — SIGNIFICANT CHANGE UP (ref 0.2–1.2)
BUN SERPL-MCNC: 8 MG/DL — SIGNIFICANT CHANGE UP (ref 7–18)
CALCIUM SERPL-MCNC: 7.5 MG/DL — LOW (ref 8.4–10.5)
CHLORIDE SERPL-SCNC: 102 MMOL/L — SIGNIFICANT CHANGE UP (ref 96–108)
CO2 SERPL-SCNC: 29 MMOL/L — SIGNIFICANT CHANGE UP (ref 22–31)
CREAT SERPL-MCNC: 1.16 MG/DL — SIGNIFICANT CHANGE UP (ref 0.5–1.3)
GLUCOSE SERPL-MCNC: 90 MG/DL — SIGNIFICANT CHANGE UP (ref 70–99)
HCT VFR BLD CALC: 33.6 % — LOW (ref 39–50)
HGB BLD-MCNC: 10.4 G/DL — LOW (ref 13–17)
MAGNESIUM SERPL-MCNC: 2.1 MG/DL — SIGNIFICANT CHANGE UP (ref 1.6–2.6)
MCHC RBC-ENTMCNC: 29.7 PG — SIGNIFICANT CHANGE UP (ref 27–34)
MCHC RBC-ENTMCNC: 31 GM/DL — LOW (ref 32–36)
MCV RBC AUTO: 96 FL — SIGNIFICANT CHANGE UP (ref 80–100)
NRBC # BLD: 0 /100 WBCS — SIGNIFICANT CHANGE UP (ref 0–0)
PHOSPHATE SERPL-MCNC: 3.1 MG/DL — SIGNIFICANT CHANGE UP (ref 2.5–4.5)
PLATELET # BLD AUTO: 88 K/UL — LOW (ref 150–400)
POTASSIUM SERPL-MCNC: 3.7 MMOL/L — SIGNIFICANT CHANGE UP (ref 3.5–5.3)
POTASSIUM SERPL-SCNC: 3.7 MMOL/L — SIGNIFICANT CHANGE UP (ref 3.5–5.3)
PROT SERPL-MCNC: 6.6 G/DL — SIGNIFICANT CHANGE UP (ref 6–8.3)
RBC # BLD: 3.5 M/UL — LOW (ref 4.2–5.8)
RBC # FLD: 17.7 % — HIGH (ref 10.3–14.5)
SODIUM SERPL-SCNC: 138 MMOL/L — SIGNIFICANT CHANGE UP (ref 135–145)
WBC # BLD: 5.63 K/UL — SIGNIFICANT CHANGE UP (ref 3.8–10.5)
WBC # FLD AUTO: 5.63 K/UL — SIGNIFICANT CHANGE UP (ref 3.8–10.5)

## 2021-01-28 PROCEDURE — 80053 COMPREHEN METABOLIC PANEL: CPT

## 2021-01-28 PROCEDURE — 83540 ASSAY OF IRON: CPT

## 2021-01-28 PROCEDURE — 84300 ASSAY OF URINE SODIUM: CPT

## 2021-01-28 PROCEDURE — 82436 ASSAY OF URINE CHLORIDE: CPT

## 2021-01-28 PROCEDURE — 83550 IRON BINDING TEST: CPT

## 2021-01-28 PROCEDURE — 80061 LIPID PANEL: CPT

## 2021-01-28 PROCEDURE — A9502: CPT

## 2021-01-28 PROCEDURE — 97161 PT EVAL LOW COMPLEX 20 MIN: CPT

## 2021-01-28 PROCEDURE — 80307 DRUG TEST PRSMV CHEM ANLYZR: CPT

## 2021-01-28 PROCEDURE — 78452 HT MUSCLE IMAGE SPECT MULT: CPT

## 2021-01-28 PROCEDURE — 70486 CT MAXILLOFACIAL W/O DYE: CPT

## 2021-01-28 PROCEDURE — 83935 ASSAY OF URINE OSMOLALITY: CPT

## 2021-01-28 PROCEDURE — 70450 CT HEAD/BRAIN W/O DYE: CPT

## 2021-01-28 PROCEDURE — 84100 ASSAY OF PHOSPHORUS: CPT

## 2021-01-28 PROCEDURE — 83880 ASSAY OF NATRIURETIC PEPTIDE: CPT

## 2021-01-28 PROCEDURE — 82378 CARCINOEMBRYONIC ANTIGEN: CPT

## 2021-01-28 PROCEDURE — 71275 CT ANGIOGRAPHY CHEST: CPT

## 2021-01-28 PROCEDURE — 96365 THER/PROPH/DIAG IV INF INIT: CPT

## 2021-01-28 PROCEDURE — 87635 SARS-COV-2 COVID-19 AMP PRB: CPT

## 2021-01-28 PROCEDURE — 83615 LACTATE (LD) (LDH) ENZYME: CPT

## 2021-01-28 PROCEDURE — 83605 ASSAY OF LACTIC ACID: CPT

## 2021-01-28 PROCEDURE — 93017 CV STRESS TEST TRACING ONLY: CPT

## 2021-01-28 PROCEDURE — 96375 TX/PRO/DX INJ NEW DRUG ADDON: CPT

## 2021-01-28 PROCEDURE — 85027 COMPLETE CBC AUTOMATED: CPT

## 2021-01-28 PROCEDURE — 84436 ASSAY OF TOTAL THYROXINE: CPT

## 2021-01-28 PROCEDURE — 82140 ASSAY OF AMMONIA: CPT

## 2021-01-28 PROCEDURE — 82746 ASSAY OF FOLIC ACID SERUM: CPT

## 2021-01-28 PROCEDURE — 85730 THROMBOPLASTIN TIME PARTIAL: CPT

## 2021-01-28 PROCEDURE — 84443 ASSAY THYROID STIM HORMONE: CPT

## 2021-01-28 PROCEDURE — 93306 TTE W/DOPPLER COMPLETE: CPT

## 2021-01-28 PROCEDURE — U0005: CPT

## 2021-01-28 PROCEDURE — 99285 EMERGENCY DEPT VISIT HI MDM: CPT

## 2021-01-28 PROCEDURE — 95819 EEG AWAKE AND ASLEEP: CPT

## 2021-01-28 PROCEDURE — 84484 ASSAY OF TROPONIN QUANT: CPT

## 2021-01-28 PROCEDURE — 71045 X-RAY EXAM CHEST 1 VIEW: CPT

## 2021-01-28 PROCEDURE — 85025 COMPLETE CBC W/AUTO DIFF WBC: CPT

## 2021-01-28 PROCEDURE — 87040 BLOOD CULTURE FOR BACTERIA: CPT

## 2021-01-28 PROCEDURE — 85610 PROTHROMBIN TIME: CPT

## 2021-01-28 PROCEDURE — 84550 ASSAY OF BLOOD/URIC ACID: CPT

## 2021-01-28 PROCEDURE — 80048 BASIC METABOLIC PNL TOTAL CA: CPT

## 2021-01-28 PROCEDURE — 95957 EEG DIGITAL ANALYSIS: CPT

## 2021-01-28 PROCEDURE — 85379 FIBRIN DEGRADATION QUANT: CPT

## 2021-01-28 PROCEDURE — 36415 COLL VENOUS BLD VENIPUNCTURE: CPT

## 2021-01-28 PROCEDURE — 82962 GLUCOSE BLOOD TEST: CPT

## 2021-01-28 PROCEDURE — 82570 ASSAY OF URINE CREATININE: CPT

## 2021-01-28 PROCEDURE — 84560 ASSAY OF URINE/URIC ACID: CPT

## 2021-01-28 PROCEDURE — 93005 ELECTROCARDIOGRAM TRACING: CPT

## 2021-01-28 PROCEDURE — 83735 ASSAY OF MAGNESIUM: CPT

## 2021-01-28 PROCEDURE — 82607 VITAMIN B-12: CPT

## 2021-01-28 RX ORDER — METOPROLOL TARTRATE 50 MG
1 TABLET ORAL
Qty: 30 | Refills: 0
Start: 2021-01-28 | End: 2021-02-26

## 2021-01-28 RX ORDER — LISINOPRIL 2.5 MG/1
0 TABLET ORAL
Qty: 0 | Refills: 0 | DISCHARGE

## 2021-01-28 RX ORDER — CALCITRIOL 0.5 UG/1
1 CAPSULE ORAL
Qty: 20 | Refills: 0
Start: 2021-01-28 | End: 2021-02-16

## 2021-01-28 RX ORDER — FERROUS SULFATE 325(65) MG
1 TABLET ORAL
Qty: 20 | Refills: 0
Start: 2021-01-28 | End: 2021-02-16

## 2021-01-28 RX ORDER — MAGNESIUM OXIDE 400 MG ORAL TABLET 241.3 MG
1 TABLET ORAL
Qty: 42 | Refills: 0
Start: 2021-01-28 | End: 2021-02-17

## 2021-01-28 RX ADMIN — HEPARIN SODIUM 5000 UNIT(S): 5000 INJECTION INTRAVENOUS; SUBCUTANEOUS at 05:18

## 2021-01-28 RX ADMIN — Medication 1 MILLIGRAM(S): at 10:54

## 2021-01-28 RX ADMIN — Medication 25 MILLIGRAM(S): at 05:18

## 2021-01-28 RX ADMIN — Medication 105 MILLIGRAM(S): at 05:18

## 2021-01-28 RX ADMIN — CALCITRIOL 0.5 MICROGRAM(S): 0.5 CAPSULE ORAL at 10:54

## 2021-01-28 RX ADMIN — Medication 162 MILLIGRAM(S): at 10:54

## 2021-01-28 RX ADMIN — PANTOPRAZOLE SODIUM 40 MILLIGRAM(S): 20 TABLET, DELAYED RELEASE ORAL at 05:18

## 2021-01-28 NOTE — PROGRESS NOTE ADULT - PROVIDER SPECIALTY LIST ADULT
Electrophysiology
Internal Medicine
Internal Medicine
Cardiology
Electrophysiology
Internal Medicine
Internal Medicine
Gastroenterology

## 2021-01-28 NOTE — DISCHARGE NOTE NURSING/CASE MANAGEMENT/SOCIAL WORK - PATIENT PORTAL LINK FT
You can access the FollowMyHealth Patient Portal offered by [facility] by registering at the following website: [email or web address]. By joining [facility]’s FollowMyHealth portal, you will also be able to view your health information using other applications (apps) compatible with our system.

## 2021-01-28 NOTE — PROGRESS NOTE ADULT - SUBJECTIVE AND OBJECTIVE BOX
EP Attending  HISTORY OF PRESENT ILLNESS: HPI:  60 year old male from home with PMHx of multiple sclerosis and no significant PSHx presents to the ED  after experiencing an episode of seizure at his neurologist office. Patient reports that he has not had an MRI done in the past twenty-five years related to his MS and that he is not currently on medication for his condition, thus prompting him to go see his neurologist at [place] Dr. Rizo. Patient endorses that just prior to arrival today he experienced loss of consciousness while in the examination room of his doctor's examination room. Patient states that during the episode he was generally shaking, with his eyes open and rolling back with a right-sided gaze deviation. Patient reports that the entire episode lasted for approximately two to three minutes, after which he returned to his baseline spontaneously with some postictal confusion as per dr office. Patient was then escorted to the ED via EMS immediately. In the ED, patient denies any associated chest pain, shortness of breath, sweatiness, vomiting, and any other symptoms. Pt also has complaints of severe post nasal drip over the past few days. Patient reports that his symptoms are caused by a decrease in po intake, which he states has caused him to become very dehydrated. Patient endorses that he has been taking Benadryl 24 grams q4h at home in order to attempt to dry up the post nasal drip. Patient additionally complains of some mild dizziness. Pt endorsed diarrhoea for last 3 days and has been coughing up and vomiting mucous that is dripping from back of his throat. (25 Jan 2021 17:08)    1/26- seen in ED. Mr Nguyen is a 60yoM followed by Dr Gudino in our office, for mild hypertension.  His last echo and stress test were ~2yrs ago.  He has multiple sclerosis but no recent episodes.  He has one prior episode of fainting, many years ago.  He fainted while attending a Neurology office visit, collapsing suddenly in front of his wife and physician, who called EMS to evaluate him.  He has been dealing with sinus congestion for a few months since the Winter holidays, and had not found relief with nasal steroids and antihistamines and oral antihistamines.  He had a supply of drug samples from a family member, including Azithromycin. He was 4 days into this self-prescribed attempt at symptom relief when he had his fainting episode.  There was nearly no prodrome- just sudden lightheadedness and weakness in the legs before collapsing.  No chest pain, palpitations, shortness of breath, visual auditory or gustatory aura. When he awoke a few moments later he felt like he was back to normal, with no desire to urinate/defecate, and no weakness or numbness.  A 10 pt ROS is otherwise negative.    1/27- anxious to go home today, no new complaints.  walking without lightheadedness or dizziness.  1/28 - feeling well today, plans to be discharged. no new complaints. no palpitations orthopnea or PND.    acetaminophen   Tablet .. 650 milliGRAM(s) Oral every 6 hours PRN  aspirin  chewable 162 milliGRAM(s) Oral daily  calcitriol   Capsule 0.5 MICROGram(s) Oral daily  folic acid 1 milliGRAM(s) Oral daily  heparin   Injectable 5000 Unit(s) SubCutaneous every 12 hours  HYDROmorphone  Injectable 0.5 milliGRAM(s) IV Push every 6 hours PRN  LORazepam   Injectable 2 milliGRAM(s) IV Push every 4 hours PRN  metoprolol succinate ER 25 milliGRAM(s) Oral daily  ondansetron Injectable 8 milliGRAM(s) IV Push two times a day  pantoprazole    Tablet 40 milliGRAM(s) Oral before breakfast  sodium chloride 0.9%. 1000 milliLiter(s) IV Continuous <Continuous>  thiamine Injectable 100 milliGRAM(s) IntraMuscular once  thiamine IVPB 500 milliGRAM(s) IV Intermittent every 8 hours                            10.4   5.63  )-----------( 88       ( 28 Jan 2021 08:48 )             33.6       01-28    138  |  102  |  8   ----------------------------<  90  3.7   |  29  |  1.16    Ca    7.5<L>      28 Jan 2021 08:48  Phos  3.1     01-28  Mg     2.1     01-28    TPro  6.6  /  Alb  2.7<L>  /  TBili  0.9  /  DBili  x   /  AST  19  /  ALT  16  /  AlkPhos  120  01-28      CARDIAC MARKERS ( 25 Jan 2021 17:59 )  <0.015 ng/mL / x     / x     / x     / x      CARDIAC MARKERS ( 25 Jan 2021 14:26 )  <0.015 ng/mL / x     / x     / x     / x      CARDIAC MARKERS ( 25 Jan 2021 13:10 )  <0.015 ng/mL / x     / x     / x     / x          T(C): 36.7 (01-28-21 @ 07:23), Max: 36.8 (01-27-21 @ 23:28)  HR: 66 (01-28-21 @ 07:23) (66 - 104)  BP: 131/77 (01-28-21 @ 07:23) (99/79 - 138/69)  RR: 18 (01-28-21 @ 07:23) (17 - 18)  SpO2: 97% (01-28-21 @ 07:23) (96% - 100%)  Wt(kg): --    I&O's Summary    27 Jan 2021 07:01  -  28 Jan 2021 07:00  --------------------------------------------------------  IN: 930 mL / OUT: 0 mL / NET: 930 mL    28 Jan 2021 07:01  -  28 Jan 2021 10:26  --------------------------------------------------------  IN: 210 mL / OUT: 0 mL / NET: 210 mL      General: Well nourished, no acute distress, alert and oriented x 3  Head: normocephalic, no trauma  Neck: no JVD, no bruit, supple, not enlarged  CV: S1S2, no S3, regular rate, rhythm is SINUS, no murmurs.    Lungs: clear BL, no rales or wheezes  Abdomen: bowel sounds +, soft, nontender, nondistended  Extremities: no clubbing, cyanosis or edema  Neuro: Moves all 4 extremities, sensation intact x 4 extremities  Skin: warm and moist, normal turgor  Psych: Mood and affect are appropriate for circumstances  MSK: normal range of motion and strength x4 extremities.    TELEMETRY: Sinus tachy overnight.  QT interval remains > 50% of RR interval.    ECG:  Sinus rhythm, QTc ~540ms, ST depression/lateral T wave inversion.  ST/T waves accentuated compared to prior, and he has a history of QTc-prolongation (~450ms in office records).  Echo: Normal EF and valves 2019.    ASSESSMENT/PLAN: 	60y Male with concern for cardiogenic/arrhythmogenic syncope:    He has a prolonged QTc interval at baseline ~450-460ms in the office that was over 560ms on Azithromycin.    1) Stress test results with reassuring imaging, poor exercise capacity.  Discussed prognostic significance of poor exercise capacity and recommended he start walking more at home.  2) Recommend beta blocker, i.e. Metoprolol tartrate 12.5-25mg BID. (Less $$ than Metoprolol succinate)  3) Telemetry check shows QT still > 50% of RR interval.  4) Electrolytes in normal range.  5) Recommend Cardio-Genetics as outpatient re: QT prolongation. (Dr Del Rio at [facility])  6) No indication for ICD at this time.  7) EtOH cessation.    From EP perspective OK for discharge.    Avinash Key M.D.  Cardiac Electrophysiology    office [phone]  pager [phone]

## 2021-01-28 NOTE — PROGRESS NOTE ADULT - SUBJECTIVE AND OBJECTIVE BOX
Patient is a 60y old  Male who presents with a chief complaint of seizure (28 Jan 2021 10:26)/syncopy/dehydration/electrolyte umbalance, NST negative for ischemia, stable D/C home today, F/U GI out patient anemia work up, scopy/scan of ABD/pelvis      INTERVAL HPI/OVERNIGHT EVENTS:  T(C): 36.7 (01-28-21 @ 07:23), Max: 36.8 (01-27-21 @ 23:28)  HR: 66 (01-28-21 @ 07:23) (66 - 104)  BP: 131/77 (01-28-21 @ 07:23) (99/79 - 138/69)  RR: 18 (01-28-21 @ 07:23) (17 - 18)  SpO2: 97% (01-28-21 @ 07:23) (96% - 100%)  Wt(kg): --    LABS:                        10.4   5.63  )-----------( 88       ( 28 Jan 2021 08:48 )             33.6     01-28    138  |  102  |  8   ----------------------------<  90  3.7   |  29  |  1.16    Ca    7.5<L>      28 Jan 2021 08:48  Phos  3.1     01-28  Mg     2.1     01-28    TPro  6.6  /  Alb  2.7<L>  /  TBili  0.9  /  DBili  x   /  AST  19  /  ALT  16  /  AlkPhos  120  01-28        CAPILLARY BLOOD GLUCOSE            RADIOLOGY & ADDITIONAL TESTS:    Consultant(s) Notes Reviewed:  [x ] YES  [ ] NO    PHYSICAL EXAM:  GENERAL: well built, well nourished  HEAD:  Atraumatic, Normocephalic  EYES: EOMI, PERRLA, conjunctiva and sclera clear  ENT: No tonsillar erythema, exudates, or enlargement; Moist mucous membranes, Good dentition, No lesions  NECK: Supple, No JVD, Normal thyroid, no enlarged nodes  NERVOUS SYSTEM:  Alert & Oriented X3, Good concentration; Motor Strength 5/5 B/L upper and lower extremities; DTRs 2+ intact and symmetric, sensory intact  CHEST/LUNG: B/L good air entry; No rales, rhonchi, or wheezing  HEART: S1S2 normal, no S3, Regular rate and rhythm; No murmurs, rubs, or gallops  ABDOMEN: Soft, Nontender, Nondistended; Bowel sounds present  EXTREMITIES:  2+ Peripheral Pulses, No clubbing, cyanosis, or edema  LYMPH: No lymphadenopathy noted  SKIN: No rashes or lesions    Care Discussed with Consultants/Other Providers [ x] YES  [ ] NO

## 2021-01-28 NOTE — PROGRESS NOTE ADULT - ASSESSMENT
60 yr old male, from home, H/O palpitation/allergy rhinitis, admit hospital for syncopy/seizure(?)/dehydration/tachycardia/hypocalcemia, associated with sinus symptom, nausea not eating well for two weeks,   CTA negative for PE, CT head negative for acute finding, S/P  IV hydration, supplement calcemia/magnesium,  lab also showed Iron deficiency anemia, GI consult, anemia work up out patient, scopy/CT ABD/pelvis, slightly elevated CEA level, NST negative, D/C planning home, F/U PMD's clinic one week moniotr lab, GI for scopy/CT ABD/pelvis. 
60 yr old male, from home, H/O palpitation/allergy rhinitis, admit hospital for syncopy/seizure(?)/dehydration/tachycardia/hypocalcemia, associated with sinus symptom, nausea not eating well for two weeks,   CTA negative for PE, neuro consult, EEG,  IV hydration, supplement calcemia, lab also showed Iron deficiency anemia, check  stool occult blood, anemia work up, F/U NST result, EEG result, supplement magnesium/calcium, fall precaution, cardiology/neuro follow up, GI consult. If stool occult positive, CT ABD/pelvis, CEA level
60 yr old male, from home, H/O palpitation/allergy rhinitis, admit hospital for syncopy/seizure(?)/dehydration/tachycardia/hypocalcemia, associated with sinus symptom, nausea not eating well,  CTA negative for PE, neuro consult, EEG/MRI of brain, IV hydration, supplement calcemia, tele monitor for now.
60 year old male from home with PMHx of multiple sclerosis and no significant PSHx presents to the ED  after experiencing an episode of seizure at his neurologist office.   patient ADMITTED FOR workup of newonset seizure.
1. Iron deficiency anemia  2. Constipation  3. Intermittent rectal bleeding with bowel movement  4. R/o colorectal neoplasm  5. Vomiting improved  6. No evidence of acute GI bleeding    Suggestions:    1. Monitor H/H  2. Transfuse PRBC as needed  3. Protonix daily  4. Avoid NSAID  5. Check CEA  6. Colonoscopy out patient  7. Daily stool softener / Laxative  8. High fiber diet  9. DVT prophylaxis

## 2021-01-28 NOTE — PROGRESS NOTE ADULT - SUBJECTIVE AND OBJECTIVE BOX
[   ] ICU                                          [   ] CCU                                      [ X ] Medical Floor    Patient is a 60 year old male with iron deficiency anemia. GI consulted to evaluate.         HPI:  60 year old male from home with past medical history significant for multiple sclerosis admitted with syncope. Pt also has complaints of severe post nasal drip over the past few days associated with persistent nausea and vomiting after swallowing mucous and post nasal drips. Patient also reports long history of worsening constipation associated with bloating and intermittent rectal bleeding with straining.     Today patient is comfortable with out any new complaint.  Patient is comfortable. No new complaints reported, No abdominal pain, N/V, hematemesis, hematochezia, melena, fever, chills, chest pain, SOB, cough or diarrhea reported.        PAIN MANAGEMENT:  Pain Scale:                0 /10  Pain Location:      Prior Colonoscopy:  No prior colonoscopy    PAST MEDICAL HISTORY  MS (multiple sclerosis)        PAST SURGICAL HISTORY  No significant past surgical history        Allergies    Keflex (Other)    Intolerances  None       SOCIAL HISTORY  Advanced Directives:       [ X Full Code       [  ] DNR  Marital Status:         [  ] M      [ X ] S      [  ] D       [  ] W  Children:       [ X ] Yes      [  ] No  Occupation:        [  ] Employed       [ X ] Unemployed       [  ] Retired  Diet:       [ X ] Regular       [  ] PEG feeding          [  ] NG tube feeding  Drug Use:           [ X] Patient denied          [  ] Yes  Alcohol:           [ X ] No             [  ] Yes (socially)         [  ] Yes (chronic)  Tobacco:           [  ] Yes           [ X ] No      FAMILY HISTORY  [ X ] Heart Disease            [ X ] Diabetes             [ X ] HTN             [  ] Colon Cancer             [  ] Stomach Cancer              [  ] Pancreatic Cancer          VITALS  Vital Signs Last 24 Hrs  T(C): 36.7 (28 Jan 2021 07:23), Max: 36.8 (27 Jan 2021 23:28)  T(F): 98.1 (28 Jan 2021 07:23), Max: 98.2 (27 Jan 2021 23:28)  HR: 66 (28 Jan 2021 07:23) (66 - 104)  BP: 131/77 (28 Jan 2021 07:23) (99/79 - 138/69) --  RR: 18 (28 Jan 2021 07:23) (17 - 18)  SpO2: 97% (28 Jan 2021 07:23) (96% - 100%)       MEDICATIONS  (STANDING):  aspirin  chewable 162 milliGRAM(s) Oral daily  calcitriol   Capsule 0.5 MICROGram(s) Oral daily  folic acid 1 milliGRAM(s) Oral daily  heparin   Injectable 5000 Unit(s) SubCutaneous every 12 hours  metoprolol succinate ER 25 milliGRAM(s) Oral daily  ondansetron Injectable 8 milliGRAM(s) IV Push two times a day  pantoprazole    Tablet 40 milliGRAM(s) Oral before breakfast  sodium chloride 0.9%. 1000 milliLiter(s) (80 mL/Hr) IV Continuous <Continuous>  thiamine Injectable 100 milliGRAM(s) IntraMuscular once  thiamine IVPB 500 milliGRAM(s) IV Intermittent every 8 hours    MEDICATIONS  (PRN):  acetaminophen   Tablet .. 650 milliGRAM(s) Oral every 6 hours PRN Temp greater or equal to 38C (100.4F), Mild Pain (1 - 3)  HYDROmorphone  Injectable 0.5 milliGRAM(s) IV Push every 6 hours PRN Severe Pain (7 - 10)  LORazepam   Injectable 2 milliGRAM(s) IV Push every 4 hours PRN CIWA-Ar score 8 or greater                            10.4   5.63  )-----------( 88       ( 28 Jan 2021 08:48 )             33.6       01-27    140  |  107  |  9   ----------------------------<  98  3.9   |  26  |  1.29    Ca    7.0<L>      27 Jan 2021 07:50  Phos  3.1     01-27  Mg     1.4     01-27

## 2021-01-30 LAB
CULTURE RESULTS: SIGNIFICANT CHANGE UP
CULTURE RESULTS: SIGNIFICANT CHANGE UP
SPECIMEN SOURCE: SIGNIFICANT CHANGE UP
SPECIMEN SOURCE: SIGNIFICANT CHANGE UP

## 2021-03-25 ENCOUNTER — NON-APPOINTMENT (OUTPATIENT)
Age: 61
End: 2021-03-25

## 2021-04-27 DIAGNOSIS — R94.31 ABNORMAL ELECTROCARDIOGRAM [ECG] [EKG]: ICD-10-CM

## 2021-04-30 ENCOUNTER — NON-APPOINTMENT (OUTPATIENT)
Age: 61
End: 2021-04-30

## 2021-04-30 ENCOUNTER — APPOINTMENT (OUTPATIENT)
Dept: CARDIOLOGY | Facility: CLINIC | Age: 61
End: 2021-04-30
Payer: COMMERCIAL

## 2021-04-30 ENCOUNTER — LABORATORY RESULT (OUTPATIENT)
Age: 61
End: 2021-04-30

## 2021-04-30 VITALS
DIASTOLIC BLOOD PRESSURE: 77 MMHG | HEIGHT: 74 IN | BODY MASS INDEX: 32.08 KG/M2 | SYSTOLIC BLOOD PRESSURE: 128 MMHG | WEIGHT: 250 LBS | HEART RATE: 109 BPM | OXYGEN SATURATION: 99 %

## 2021-04-30 PROCEDURE — 93000 ELECTROCARDIOGRAM COMPLETE: CPT

## 2021-04-30 PROCEDURE — 99072 ADDL SUPL MATRL&STAF TM PHE: CPT

## 2021-04-30 PROCEDURE — 99244 OFF/OP CNSLTJ NEW/EST MOD 40: CPT

## 2021-04-30 NOTE — FAMILY HISTORY
[FreeTextEntry1] : FamilyHistory_20_twCiteListControlStart FamilyHistory_20_twCiteListControlEnd [id number] \par A four-generation family history was constructed and scanned into [employer]. \par Family history is significant for: \par siblings\par 62 yo sister healthy no known med probs- 31 yo son\par \par Mother dec 81 yo abscess on colon, sepsis, hx TB\par Maternal aunts none\par Maternal uncles none\par Maternal Grandmother dec 80s fall, dec the following day\par Maternal Grandfather dec med hx unk\par \par Father dec 70s  hx TB lung resection\par Paternal aunts x2 dec med hx unk deec 70-80s\par Paternal uncles x1 dec 70s med hx unk\par + cousins unk med hx\par Paternal Grandfather dec med hx unk\par Paternal Grandmother dec 75-80 unk cause , med hx unk\par \par children:\par 27 yo son - healthy no children\par 24 yo son  healthy no children\par 32 yo daughter estranged  healthy no children\par \par his maternal families originate from [place] and paternal families originate from [place], [place]\par No Ashkenazi Scientologist ancestry. \par Family history was negative for consanguinity  \par No family history of SIDS\par  \par no congenital hearing loss\par no suspicious car accidents\par

## 2021-04-30 NOTE — HISTORY OF PRESENT ILLNESS
[FreeTextEntry1] : CAMERON CARTWRIGHT is a 59 yo M PMH MS, seizure with tachycardia and long QT, undergoing a workup for possible long QT syndrome\par he initially presented to the ED 1/25/21 following a witnessed seizure at his neurologist office\par he is reported to have 2-3 min tonic clonic seizure with some post icalt but quick recovery to baseline MS, no incontinence no tongue biting\par HR of 130bpm noted at the time of seizure\par he was sent to the ED Qtc was prolong around 474ms at the time he underwent echo (nm) and stress that did not show ishcemia but did not demonstrate appropriate QT shortening with exercise\par his Qtc did shorten to almost normal while admitted\par \par he was initially diagnosed with MS years ago, but states no symptoms since 1988\par he had 2 episodes lost vision in one eye, treated with methylprednisone  -resolved  no further \par was at neurologist office that week for fu\par states he had a sinus infection at the time and took an old zpack at the time of the seizure\par admits to dehydration at the time\par \par he admits to diziness when standing up, no syncope\par no palpitaitons\par \par today he is referred for a cardiogenomic evaluation\par

## 2021-04-30 NOTE — REASON FOR VISIT
[FreeTextEntry3] : CAMERON CARTWRIGHT  is being seen  for an initial consultation at the Cardiogenomics Program at [facility] on 04/30/2021.   Mr. CARTWRIGHT was referred by Dr Wagner and Dr Gudino for hereditary cardiac predisposition risk assessment and counseling, due to syncope, with tachycardia and long QT\par \par

## 2021-05-14 ENCOUNTER — TRANSCRIPTION ENCOUNTER (OUTPATIENT)
Age: 61
End: 2021-05-14

## 2021-06-04 DIAGNOSIS — Z01.818 ENCOUNTER FOR OTHER PREPROCEDURAL EXAMINATION: ICD-10-CM

## 2021-06-05 ENCOUNTER — APPOINTMENT (OUTPATIENT)
Dept: DISASTER EMERGENCY | Facility: CLINIC | Age: 61
End: 2021-06-05

## 2021-06-05 LAB — SARS-COV-2 N GENE NPH QL NAA+PROBE: NOT DETECTED

## 2021-06-05 NOTE — PRE PROCEDURE NOTE - PRE PROCEDURE EVALUATION
Attending Physician:   Miladis                         Procedure: EGD EUS    Indication for Procedure: abnormal endoscopy  ________________________________________________________  PAST MEDICAL & SURGICAL HISTORY:  MS (multiple sclerosis)    No significant past surgical history      ALLERGIES:  Keflex (Other)    HOME MEDICATIONS:  SIMVASTATIN 10 MG TABLET:     AICD/PPM: [ ] yes   [ ] no    PERTINENT LAB DATA:                      PHYSICAL EXAMINATION:    T(C): --  HR: --  BP: --  RR: --  SpO2: --    Constitutional: NAD  HEENT: PERRLA, EOMI,    Neck:  No JVD  Respiratory: CTAB/L  Cardiovascular: S1 and S2  Gastrointestinal: BS+, soft, NT/ND  Extremities: No peripheral edema  Neurological: A/O x 3, no focal deficits  Psychiatric: Normal mood, normal affect  Skin: No rashes    ASA Class: I [ ]  II [ ]  III [ ]  IV [ ]    COMMENTS:    The patient is a suitable candidate for the planned procedure unless box checked [ ]  No, explain:

## 2021-06-08 ENCOUNTER — OUTPATIENT (OUTPATIENT)
Dept: OUTPATIENT SERVICES | Facility: HOSPITAL | Age: 61
LOS: 1 days | End: 2021-06-08
Payer: COMMERCIAL

## 2021-06-08 ENCOUNTER — APPOINTMENT (OUTPATIENT)
Dept: GASTROENTEROLOGY | Facility: HOSPITAL | Age: 61
End: 2021-06-08

## 2021-06-08 ENCOUNTER — RESULT REVIEW (OUTPATIENT)
Age: 61
End: 2021-06-08

## 2021-06-08 VITALS
RESPIRATION RATE: 12 BRPM | HEART RATE: 127 BPM | TEMPERATURE: 97 F | SYSTOLIC BLOOD PRESSURE: 137 MMHG | WEIGHT: 250 LBS | DIASTOLIC BLOOD PRESSURE: 84 MMHG | OXYGEN SATURATION: 100 % | HEIGHT: 74 IN

## 2021-06-08 VITALS
RESPIRATION RATE: 14 BRPM | HEART RATE: 96 BPM | OXYGEN SATURATION: 99 % | SYSTOLIC BLOOD PRESSURE: 110 MMHG | DIASTOLIC BLOOD PRESSURE: 70 MMHG

## 2021-06-08 DIAGNOSIS — K31.9 DISEASE OF STOMACH AND DUODENUM, UNSPECIFIED: ICD-10-CM

## 2021-06-08 PROCEDURE — 43239 EGD BIOPSY SINGLE/MULTIPLE: CPT | Mod: GC

## 2021-06-08 PROCEDURE — 88305 TISSUE EXAM BY PATHOLOGIST: CPT | Mod: 26

## 2021-06-08 PROCEDURE — 43259 EGD US EXAM DUODENUM/JEJUNUM: CPT | Mod: GC

## 2021-06-08 PROCEDURE — 43259 EGD US EXAM DUODENUM/JEJUNUM: CPT

## 2021-06-08 PROCEDURE — 43239 EGD BIOPSY SINGLE/MULTIPLE: CPT

## 2021-06-08 PROCEDURE — 88305 TISSUE EXAM BY PATHOLOGIST: CPT

## 2021-06-08 RX ORDER — SODIUM CHLORIDE 9 MG/ML
500 INJECTION INTRAMUSCULAR; INTRAVENOUS; SUBCUTANEOUS
Refills: 0 | Status: COMPLETED | OUTPATIENT
Start: 2021-06-08 | End: 2021-06-08

## 2021-06-08 RX ORDER — ALLOPURINOL 300 MG
1 TABLET ORAL
Qty: 0 | Refills: 0 | DISCHARGE

## 2021-06-08 RX ORDER — SIMVASTATIN 20 MG/1
0 TABLET, FILM COATED ORAL
Qty: 0 | Refills: 0 | DISCHARGE

## 2021-06-08 RX ORDER — LISINOPRIL 2.5 MG/1
1 TABLET ORAL
Qty: 0 | Refills: 0 | DISCHARGE

## 2021-06-08 RX ADMIN — SODIUM CHLORIDE 75 MILLILITER(S): 9 INJECTION INTRAMUSCULAR; INTRAVENOUS; SUBCUTANEOUS at 10:07

## 2021-06-09 PROBLEM — I10 ESSENTIAL (PRIMARY) HYPERTENSION: Chronic | Status: ACTIVE | Noted: 2021-06-08

## 2021-06-16 ENCOUNTER — APPOINTMENT (OUTPATIENT)
Dept: CARDIOLOGY | Facility: CLINIC | Age: 61
End: 2021-06-16
Payer: COMMERCIAL

## 2021-06-16 PROCEDURE — 99203 OFFICE O/P NEW LOW 30 MIN: CPT | Mod: 95

## 2021-06-16 NOTE — REASON FOR VISIT
[FreeTextEntry3] : CAMERON CARTWRIGHT  was seen  for an initial consultation at the Cardiogenomics Program at [facility] on 04/30/2021.   Mr. CARTWRIGHT was referred by Dr Wagner and Dr Gudino for hereditary cardiac predisposition risk assessment and counseling, due to syncope, with tachycardia and long QT\par \par

## 2021-06-16 NOTE — HISTORY OF PRESENT ILLNESS
[FreeTextEntry1] : CAMERON CARTWRIGHT is a 59 yo M PMH MS, seizure with tachycardia and long QT, undergoing a workup for possible long QT syndrome\par he initially presented to the ED 1/25/21 following a witnessed seizure at his neurologist office\par he is reported to have 2-3 min tonic clonic seizure with some post icalt but quick recovery to baseline MS, no incontinence no tongue biting\par HR of 130bpm noted at the time of seizure\par he was sent to the ED Qtc was prolong around 474ms at the time he underwent echo (nm) and stress that did not show ischemia but did not demonstrate appropriate QT shortening with exercise\par his Qtc did shorten to almost normal while admitted\par \par he was initially diagnosed with MS years ago, but states no symptoms since 1988\par he had 2 episodes lost vision in one eye, treated with methylprednisone  -resolved  no further \par was at neurologist office that week for fu\par states he had a sinus infection at the time and took an old zpack at the time of the seizure\par admits to dehydration at the time\par \par he admits to diziness when standing up, no syncope\par no palpitaitons\par \par he underwent genetic testing and today presents for results\par

## 2021-06-16 NOTE — FAMILY HISTORY
[FreeTextEntry1] : FamilyHistory_20_twCiteListControlStart FamilyHistory_20_twCiteListControlEnd [id number] \par A four-generation family history was constructed and scanned into [employer]. \par Family history is significant for: \par siblings\par 62 yo sister healthy no known med probs- 29 yo son\par \par Mother dec 81 yo abscess on colon, sepsis, hx TB\par Maternal aunts none\par Maternal uncles none\par Maternal Grandmother dec 80s fall, dec the following day\par Maternal Grandfather dec med hx unk\par \par Father dec 70s  hx TB lung resection\par Paternal aunts x2 dec med hx unk deec 70-80s\par Paternal uncles x1 dec 70s med hx unk\par + cousins unk med hx\par Paternal Grandfather dec med hx unk\par Paternal Grandmother dec 75-80 unk cause , med hx unk\par \par children:\par 25 yo son - healthy no children\par 24 yo son  healthy no children\par 32 yo daughter estranged  healthy no children\par \par his maternal families originate from [place] and paternal families originate from [place], [place]\par No Ashkenazi Presybeterian ancestry. \par Family history was negative for consanguinity  \par No family history of SIDS\par  \par no congenital hearing loss\par no suspicious car accidents\par

## 2021-07-09 ENCOUNTER — APPOINTMENT (OUTPATIENT)
Dept: CARDIOLOGY | Facility: CLINIC | Age: 61
End: 2021-07-09

## 2021-07-30 ENCOUNTER — APPOINTMENT (OUTPATIENT)
Dept: ORTHOPEDIC SURGERY | Facility: CLINIC | Age: 61
End: 2021-07-30
Payer: COMMERCIAL

## 2021-07-30 VITALS
BODY MASS INDEX: 31.57 KG/M2 | OXYGEN SATURATION: 97 % | SYSTOLIC BLOOD PRESSURE: 133 MMHG | WEIGHT: 246 LBS | HEART RATE: 108 BPM | HEIGHT: 74 IN | DIASTOLIC BLOOD PRESSURE: 73 MMHG

## 2021-07-30 DIAGNOSIS — S32.009A UNSPECIFIED FRACTURE OF UNSPECIFIED LUMBAR VERTEBRA, INITIAL ENCOUNTER FOR CLOSED FRACTURE: ICD-10-CM

## 2021-07-30 PROCEDURE — 99203 OFFICE O/P NEW LOW 30 MIN: CPT

## 2021-07-30 PROCEDURE — 72100 X-RAY EXAM L-S SPINE 2/3 VWS: CPT

## 2021-07-30 NOTE — DISCUSSION/SUMMARY
[de-identified] : We discussed further treatment options both nonsurgical and surgical.  This point he wishes to continue with nonsurgical treatment.  We did discuss bracing but he does not want to proceed with this option as he feels he is improving.  He will let me know of any changes or worsening of his symptoms.

## 2021-07-30 NOTE — HISTORY OF PRESENT ILLNESS
[de-identified] : Patient is a 60-year-old male.  He had a fall approximately 1 week ago.  He complains of pain across his thoracolumbar junction.  No radiation of symptoms to his legs.  Pain is improving.  No numbness, tingling, weakness, or bowel or bladder dysfunction. No difficulty with balance or fine motor skills. No fevers or chills. No constitutional symptoms or recent unintended weight loss.

## 2021-07-30 NOTE — PHYSICAL EXAM
[Stooped] : stooped [Walker] : ambulates with walker [de-identified] : Examination of the lumbar spine reveals no midline tenderness palpation, step-offs, or skin lesions. Decreased range of motion with respect to flexion, extension, lateral bending, and rotation. No tenderness to palpation of the sciatic notch. No tenderness palpation of the bilateral greater trochanters. No pain with passive internal/external rotation of the hips. No instability of bilateral lower extremities.  Negative LEONOR. Negative straight leg raise bilaterally. No bowstring. Negative femoral stretch. 5 out of 5 iliopsoas, hip abductors, hips adductors, quadriceps, hamstrings, gastrocsoleus, tibialis anterior, extensor hallucis longus, peroneals. Grossly intact sensation to light touch bilateral lower extremities. 1+ patellar and Achilles reflexes. Downgoing Babinski. No clonus. Intact proprioception. Palpable pulses. No skin lesion and no edema on the right and left lower extremities. [de-identified] : AP lateral lumbar x-rays reveals an L1 compression fracture

## 2022-05-04 NOTE — PRE-ANESTHESIA EVALUATION ADULT - NSPREOPDXFT_GEN_ALL_CORE
Gastric polyp Body Location Override (Optional - Billing Will Still Be Based On Selected Body Map Location If Applicable): L cheek

## 2022-07-08 NOTE — PHYSICAL THERAPY INITIAL EVALUATION ADULT - GAIT DISTANCE, PT EVAL
no s/s of fatigue/150 feet Solaraze Counseling:  I discussed with the patient the risks of Solaraze including but not limited to erythema, scaling, itching, weeping, crusting, and pain.

## 2022-10-05 NOTE — ED ADULT TRIAGE NOTE - BEFAST BALANCE
No General Sunscreen Counseling: I recommended a broad spectrum sunscreen with a SPF of 30 or higher. I explained that SPF 30 sunscreens block approximately 97 percent of the sun's harmful rays. Sunscreens should be applied at least 15 minutes prior to expected sun exposure and then every 2 hours after that as long as sun exposure continues. If swimming or exercising sunscreen should be reapplied every 45 minutes to an hour after getting wet or sweating. One ounce, or the equivalent of a shot glass full of sunscreen, is adequate to protect the skin not covered by a bathing suit. I also recommended a lip balm with a sunscreen as well. Sun protective clothing can be used in lieu of sunscreen but must be worn the entire time you are exposed to the sun's rays. Products Recommended: Cerave Detail Level: Detailed

## 2023-05-03 ENCOUNTER — APPOINTMENT (OUTPATIENT)
Dept: GASTROENTEROLOGY | Facility: CLINIC | Age: 63
End: 2023-05-03
Payer: COMMERCIAL

## 2023-05-03 VITALS
WEIGHT: 250 LBS | HEIGHT: 74 IN | OXYGEN SATURATION: 99 % | DIASTOLIC BLOOD PRESSURE: 77 MMHG | HEART RATE: 94 BPM | SYSTOLIC BLOOD PRESSURE: 115 MMHG | TEMPERATURE: 96.4 F | BODY MASS INDEX: 32.08 KG/M2

## 2023-05-03 DIAGNOSIS — R19.7 DIARRHEA, UNSPECIFIED: ICD-10-CM

## 2023-05-03 DIAGNOSIS — K21.9 GASTRO-ESOPHAGEAL REFLUX DISEASE W/OUT ESOPHAGITIS: ICD-10-CM

## 2023-05-03 PROCEDURE — 99205 OFFICE O/P NEW HI 60 MIN: CPT

## 2023-05-03 NOTE — ASSESSMENT
[FreeTextEntry1] : Dyspepsia: The patient complains of dyspeptic symptoms.  The patient was advised to abide by an anti-gas (low FOD-MAP) diet.  The patient was given a pamphlet for anti-gas (low FOD-MAP).  The patient and I reviewed the anti-gas (low FOD-MAP) diet at length. The patient is to start on a trial of Simethicone one tablet 4 times a day p.r.n. abdominal pain and gas.\par GERD: The patient was advised to avoid late-night meals and dietary indiscretions.  The patient was advised to avoid fried and fatty foods.  The patient was advised to abide by an anti-GERD diet. The patient was given a pamphlet for anti-GERD.  The patient and I reviewed the anti-GERD diet at length. I recommend a trial of Pantoprazole 40 mg once a day x 3 months for the symptoms.\par Diarrhea: The patient complains of diarrhea.  I recommend a low residue diet. The patient is to avoid fiber supplementation. The patient is to consider starting a trial of a probiotic such as Align once a day.    The patient agreed and will follow-up to reassess the symptoms.  \par Gastric Lesion: The patient was noted to have a gastric lesion on prior upper endoscopy in 2021.  The patient had an endoscopic ultrasound to assess the lesion.  The pathology was benign.  I recommend a repeat endoscopic ultrasound to reassess the gastric lesion.  The patient agrees and will follow-up for the procedure.\par Anemia: The patient was found to have anemia on recent blood work.  The etiology of the anemia is most likely secondary to the gastric lesion there was previously noted in 2021.  I recommend an endoscopic ultrasound of the gastric lesion to assess for malignancy and bleeding.  The patient is to be referred for endoscopic ultrasound.\par Blood Work: I recommend blood work to assess the patient's symptoms. I recommend a CBC, SMA 24, amylase, lipase, ESR,  ,iron, TIBC, ferritin level.  I reviewed the recent blood work performed by the patient's PMD.\par Imaging Study: I recommend an imaging study to assess the symptoms. I recommend a CAT scan of the abdomen and pelvis with and without IV contrast to assess for gastric  lesions and metastasis.\par Upper Endoscopy/Endoscopic Ultrasound of the Stomach: I recommend an upper endoscopy and endoscopic ultrasound of the stomach  to assess the gastric lesion.  The patient was told of the risks and benefits of the procedure.  The patient was told of the risks of perforation, emergency surgery, bleeding, infections and missed lesions.   The patient is told not to drive, drink alcohol, use recreational drugs, exercise, or work the day of the procedure.  The patient was told of the need for an escort to accompany the patient home after the procedure. The patient is aware that the procedure may be cancelled if they fail to follow the directions.  The patient agreed and will schedule for the procedure. The patient can take the antihypertensive medication with a sip of water one hour prior to the procedure. The patient is to be n.p.o. after midnight.  The patient is to return for the procedure.\par Follow-up: The patient is to follow-up in the office in 4 weeks to reassess the symptoms. The patient was told to call the office if any further problems. \par \par \par

## 2023-05-03 NOTE — REASON FOR VISIT
Spoke with patient informed them per Dr Chato Castelan  I sent ketoconazole, keep area dry.  If no improvement in 2 weeks, see dermatology   Script for  ketoconazole (NIZORAL) 2 % cream  Sent to  [id number] - [place], [place]     Patient states understanding and no further questions at this time.   [Initial Evaluation] : an initial evaluation [Spouse] : spouse

## 2023-05-03 NOTE — HISTORY OF PRESENT ILLNESS
[FreeTextEntry1] : The colonoscopy performed by Dr. Arreola on April 28, 2021 revealed severe diverticulosis in the cecum and ascending colon and mild diverticulosis in the descending colon and sigmoid colon, a single flat 4 mm polyp in the descending colon that was removed with a cold snare and a single flat 3 mm polyp in the rectum that was removed with cold forcep and medium sized internal hemorrhoids.  The pathology revealed ileal mucosa with benign lymphoid hyperplasia with no active inflammation, granulomas, villous or crypt distortion or dysplasia (terminal ileum), descending colon polyp with known tissue seen on the slide (descending colon polyp) and tubular adenoma and hyperplastic polyp (rectal polyp).   [de-identified] : The  endoscopic ultrasound performed by another gastroenterologist, Dr. Van Redding on June 8, 2021 to assess a gastric mucosal mass/polyp found on prior upper endoscopyh revealed a gastric antral ulcerated masslike lesion just above the pyloric channel.  The endoscopic ultrasound revealed a heterogeneous lesion with loss of layers in some areas that extended to the muscularis propria.  The lesion was biopsied multiple times.  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 [de-identified] : The CT angio of the chest with IV contrast performed on January 26, 2021 revealed no pulmonary embolism or other acute cardiopulmonary abnormalities and hepatic steatosis and splenomegaly.  Also noted was a compression fracture of T4 favored to be chronic.

## 2023-05-03 NOTE — REVIEW OF SYSTEMS
[Feeling Tired] : feeling tired [SOB on Exertion] : shortness of breath during exertion [Diarrhea] : diarrhea [Heartburn] : heartburn [Bleeding] : bleeding [Bloating (gassiness)] : bloating [Arthralgias (joint pain)] : arthralgias [Anxiety] : anxiety [Easy Bruising] : a tendency for easy bruising [Negative] : Heme/Lymph [FreeTextEntry3] : blurred vision [FreeTextEntry4] : sinus issues [FreeTextEntry9] : myalgias [de-identified] : shingles in the back Xolair Counseling:  Patient informed of potential adverse effects including but not limited to fever, muscle aches, rash and allergic reactions.  The patient verbalized understanding of the proper use and possible adverse effects of Xolair.  All of the patient's questions and concerns were addressed.

## 2023-05-04 ENCOUNTER — NON-APPOINTMENT (OUTPATIENT)
Age: 63
End: 2023-05-04

## 2023-05-04 LAB
ALBUMIN SERPL ELPH-MCNC: 2.8 G/DL
ALP BLD-CCNC: 113 U/L
ALT SERPL-CCNC: 16 U/L
ANION GAP SERPL CALC-SCNC: 12 MMOL/L
AST SERPL-CCNC: 25 U/L
BASOPHILS # BLD AUTO: 0.04 K/UL
BASOPHILS NFR BLD AUTO: 1.1 %
BILIRUB SERPL-MCNC: 1.2 MG/DL
BUN SERPL-MCNC: 13 MG/DL
CALCIUM SERPL-MCNC: 7.9 MG/DL
CHLORIDE SERPL-SCNC: 103 MMOL/L
CO2 SERPL-SCNC: 24 MMOL/L
CREAT SERPL-MCNC: 1.14 MG/DL
EGFR: 72 ML/MIN/1.73M2
EOSINOPHIL # BLD AUTO: 0.12 K/UL
EOSINOPHIL NFR BLD AUTO: 3.3 %
FERRITIN SERPL-MCNC: 868 NG/ML
GGT SERPL-CCNC: 174 U/L
GLUCOSE SERPL-MCNC: 140 MG/DL
HCT VFR BLD CALC: 33.9 %
HGB BLD-MCNC: 10.4 G/DL
IMM GRANULOCYTES NFR BLD AUTO: 0 %
LYMPHOCYTES # BLD AUTO: 1.42 K/UL
LYMPHOCYTES NFR BLD AUTO: 39.3 %
MAN DIFF?: NORMAL
MCHC RBC-ENTMCNC: 30.7 GM/DL
MCHC RBC-ENTMCNC: 31.6 PG
MCV RBC AUTO: 103 FL
MONOCYTES # BLD AUTO: 0.34 K/UL
MONOCYTES NFR BLD AUTO: 9.4 %
NEUTROPHILS # BLD AUTO: 1.69 K/UL
NEUTROPHILS NFR BLD AUTO: 46.9 %
PLATELET # BLD AUTO: 125 K/UL
POTASSIUM SERPL-SCNC: 4.4 MMOL/L
PROT SERPL-MCNC: 6 G/DL
RBC # BLD: 3.29 M/UL
RBC # FLD: 18.2 %
SODIUM SERPL-SCNC: 140 MMOL/L
WBC # FLD AUTO: 3.61 K/UL

## 2023-05-11 ENCOUNTER — NON-APPOINTMENT (OUTPATIENT)
Age: 63
End: 2023-05-11

## 2023-05-12 ENCOUNTER — NON-APPOINTMENT (OUTPATIENT)
Age: 63
End: 2023-05-12

## 2023-05-19 ENCOUNTER — APPOINTMENT (OUTPATIENT)
Dept: GASTROENTEROLOGY | Facility: CLINIC | Age: 63
End: 2023-05-19
Payer: COMMERCIAL

## 2023-05-19 VITALS
OXYGEN SATURATION: 95 % | BODY MASS INDEX: 32.08 KG/M2 | DIASTOLIC BLOOD PRESSURE: 74 MMHG | HEIGHT: 74 IN | RESPIRATION RATE: 16 BRPM | SYSTOLIC BLOOD PRESSURE: 116 MMHG | HEART RATE: 106 BPM | TEMPERATURE: 97.7 F | WEIGHT: 250 LBS

## 2023-05-19 DIAGNOSIS — R10.13 EPIGASTRIC PAIN: ICD-10-CM

## 2023-05-19 DIAGNOSIS — K74.60 UNSPECIFIED CIRRHOSIS OF LIVER: ICD-10-CM

## 2023-05-19 DIAGNOSIS — D64.9 ANEMIA, UNSPECIFIED: ICD-10-CM

## 2023-05-19 DIAGNOSIS — K31.9 DISEASE OF STOMACH AND DUODENUM, UNSPECIFIED: ICD-10-CM

## 2023-05-19 PROCEDURE — 99204 OFFICE O/P NEW MOD 45 MIN: CPT

## 2023-05-19 NOTE — ASSESSMENT
[FreeTextEntry1] : 62M with pmhx of seizures, HTN, HLD presenting for evaluation of gastric antral subepithelial lesion and cirrhosis. Pt underwent CT for abd pain symptoms, found to have cirrhosis. Also last EGD 2021 showed antral mass, had EUS 2021 showing antral mass lesion, biopsies inflammatory. Referred for repeat EGD/EUS. Pt reports no complaints today, no abd pain, n/v/d/c, melena, hematochezia, fever/chills, or other issues.\par - Repeat EUS for surveillance of gastric lesion. EGD same time for varices screening given newly found cirrhosis.

## 2023-05-19 NOTE — HISTORY OF PRESENT ILLNESS
[FreeTextEntry1] : 62M with pmhx of seizures, HTN, HLD presenting for evaluation of gastric antral subepithelial lesion and cirrhosis. Pt underwent CT for abd pain symptoms, found to have cirrhosis. Also last EGD 2021 showed antral mass, had EUS 2021 showing antral mass lesion, biopsies inflammatory. Referred for repeat EGD/EUS. Pt reports no complaints today, no abd pain, n/v/d/c, melena, hematochezia, fever/chills, or other issues.\par \par

## 2023-05-26 ENCOUNTER — OUTPATIENT (OUTPATIENT)
Dept: OUTPATIENT SERVICES | Facility: HOSPITAL | Age: 63
LOS: 1 days | End: 2023-05-26
Payer: COMMERCIAL

## 2023-05-26 ENCOUNTER — TRANSCRIPTION ENCOUNTER (OUTPATIENT)
Age: 63
End: 2023-05-26

## 2023-05-26 ENCOUNTER — APPOINTMENT (OUTPATIENT)
Dept: GASTROENTEROLOGY | Facility: HOSPITAL | Age: 63
End: 2023-05-26

## 2023-05-26 ENCOUNTER — RESULT REVIEW (OUTPATIENT)
Age: 63
End: 2023-05-26

## 2023-05-26 VITALS
SYSTOLIC BLOOD PRESSURE: 122 MMHG | RESPIRATION RATE: 20 BRPM | WEIGHT: 250 LBS | TEMPERATURE: 98 F | OXYGEN SATURATION: 100 % | DIASTOLIC BLOOD PRESSURE: 63 MMHG | HEART RATE: 78 BPM | HEIGHT: 74 IN

## 2023-05-26 VITALS
DIASTOLIC BLOOD PRESSURE: 65 MMHG | SYSTOLIC BLOOD PRESSURE: 120 MMHG | HEART RATE: 75 BPM | RESPIRATION RATE: 16 BRPM | OXYGEN SATURATION: 100 %

## 2023-05-26 DIAGNOSIS — K74.60 UNSPECIFIED CIRRHOSIS OF LIVER: ICD-10-CM

## 2023-05-26 DIAGNOSIS — Z90.89 ACQUIRED ABSENCE OF OTHER ORGANS: Chronic | ICD-10-CM

## 2023-05-26 DIAGNOSIS — Z98.890 OTHER SPECIFIED POSTPROCEDURAL STATES: Chronic | ICD-10-CM

## 2023-05-26 DIAGNOSIS — K31.9 DISEASE OF STOMACH AND DUODENUM, UNSPECIFIED: ICD-10-CM

## 2023-05-26 PROCEDURE — 88312 SPECIAL STAINS GROUP 1: CPT

## 2023-05-26 PROCEDURE — 43239 EGD BIOPSY SINGLE/MULTIPLE: CPT

## 2023-05-26 PROCEDURE — 88305 TISSUE EXAM BY PATHOLOGIST: CPT | Mod: 26

## 2023-05-26 PROCEDURE — 43259 EGD US EXAM DUODENUM/JEJUNUM: CPT

## 2023-05-26 PROCEDURE — 43237 ENDOSCOPIC US EXAM ESOPH: CPT

## 2023-05-26 PROCEDURE — 88312 SPECIAL STAINS GROUP 1: CPT | Mod: 26

## 2023-05-26 PROCEDURE — 88305 TISSUE EXAM BY PATHOLOGIST: CPT

## 2023-05-26 RX ORDER — SODIUM CHLORIDE 9 MG/ML
500 INJECTION INTRAMUSCULAR; INTRAVENOUS; SUBCUTANEOUS
Refills: 0 | Status: DISCONTINUED | OUTPATIENT
Start: 2023-05-26 | End: 2023-06-09

## 2023-05-26 RX ORDER — SODIUM CHLORIDE 9 MG/ML
1000 INJECTION, SOLUTION INTRAVENOUS
Refills: 0 | Status: DISCONTINUED | OUTPATIENT
Start: 2023-05-26 | End: 2023-06-09

## 2023-05-26 NOTE — ASU DISCHARGE PLAN (ADULT/PEDIATRIC) - NS MD DC FALL RISK RISK
For information on Fall & Injury Prevention, visit: [email or web address] OR  [email or web address] OR  https://www.cdc.gov/steadi/patient.html

## 2023-05-31 LAB — SURGICAL PATHOLOGY STUDY: SIGNIFICANT CHANGE UP

## 2023-06-13 ENCOUNTER — NON-APPOINTMENT (OUTPATIENT)
Age: 63
End: 2023-06-13

## 2024-09-09 ENCOUNTER — APPOINTMENT (OUTPATIENT)
Age: 64
End: 2024-09-09
Payer: MEDICARE

## 2024-09-09 ENCOUNTER — NON-APPOINTMENT (OUTPATIENT)
Age: 64
End: 2024-09-09

## 2024-09-09 PROBLEM — Z78.9 OTHER SPECIFIED HEALTH STATUS: Chronic | Status: ACTIVE | Noted: 2023-05-26

## 2024-09-09 PROCEDURE — 99024 POSTOP FOLLOW-UP VISIT: CPT

## 2024-11-06 ENCOUNTER — APPOINTMENT (OUTPATIENT)
Age: 64
End: 2024-11-06
Payer: MEDICARE

## 2024-11-06 ENCOUNTER — NON-APPOINTMENT (OUTPATIENT)
Age: 64
End: 2024-11-06

## 2024-11-06 PROCEDURE — 92012 INTRM OPH EXAM EST PATIENT: CPT | Mod: 24

## 2024-11-19 ENCOUNTER — NON-APPOINTMENT (OUTPATIENT)
Age: 64
End: 2024-11-19

## 2024-11-19 ENCOUNTER — APPOINTMENT (OUTPATIENT)
Age: 64
End: 2024-11-19
Payer: MEDICARE

## 2024-11-19 PROCEDURE — 66984 XCAPSL CTRC RMVL W/O ECP: CPT | Mod: LT

## 2024-11-20 ENCOUNTER — APPOINTMENT (OUTPATIENT)
Age: 64
End: 2024-11-20
Payer: MEDICARE

## 2024-11-20 ENCOUNTER — NON-APPOINTMENT (OUTPATIENT)
Age: 64
End: 2024-11-20

## 2024-11-20 PROCEDURE — 99024 POSTOP FOLLOW-UP VISIT: CPT

## 2024-12-12 ENCOUNTER — NON-APPOINTMENT (OUTPATIENT)
Age: 64
End: 2024-12-12

## 2024-12-12 ENCOUNTER — APPOINTMENT (OUTPATIENT)
Age: 64
End: 2024-12-12
Payer: MEDICARE

## 2024-12-12 PROCEDURE — 99024 POSTOP FOLLOW-UP VISIT: CPT

## 2024-12-13 ENCOUNTER — EMERGENCY (EMERGENCY)
Facility: HOSPITAL | Age: 64
LOS: 1 days | Discharge: HOME | End: 2024-12-13
Attending: EMERGENCY MEDICINE
Payer: MEDICARE

## 2024-12-13 VITALS
SYSTOLIC BLOOD PRESSURE: 119 MMHG | HEART RATE: 96 BPM | TEMPERATURE: 98 F | DIASTOLIC BLOOD PRESSURE: 78 MMHG | OXYGEN SATURATION: 100 % | HEIGHT: 74 IN | RESPIRATION RATE: 16 BRPM | WEIGHT: 259.93 LBS

## 2024-12-13 DIAGNOSIS — Z90.89 ACQUIRED ABSENCE OF OTHER ORGANS: Chronic | ICD-10-CM

## 2024-12-13 DIAGNOSIS — Z98.890 OTHER SPECIFIED POSTPROCEDURAL STATES: Chronic | ICD-10-CM

## 2024-12-13 PROCEDURE — 99284 EMERGENCY DEPT VISIT MOD MDM: CPT | Mod: 25

## 2024-12-13 PROCEDURE — 73060 X-RAY EXAM OF HUMERUS: CPT

## 2024-12-13 PROCEDURE — 99284 EMERGENCY DEPT VISIT MOD MDM: CPT

## 2024-12-13 PROCEDURE — 73000 X-RAY EXAM OF COLLAR BONE: CPT | Mod: 26,LT

## 2024-12-13 PROCEDURE — 96372 THER/PROPH/DIAG INJ SC/IM: CPT

## 2024-12-13 PROCEDURE — 73000 X-RAY EXAM OF COLLAR BONE: CPT

## 2024-12-13 PROCEDURE — 73030 X-RAY EXAM OF SHOULDER: CPT

## 2024-12-13 PROCEDURE — 73030 X-RAY EXAM OF SHOULDER: CPT | Mod: 26,LT

## 2024-12-13 PROCEDURE — 73060 X-RAY EXAM OF HUMERUS: CPT | Mod: 26,LT

## 2024-12-13 RX ORDER — KETOROLAC TROMETHAMINE 30 MG/ML
30 INJECTION INTRAMUSCULAR; INTRAVENOUS ONCE
Refills: 0 | Status: DISCONTINUED | OUTPATIENT
Start: 2024-12-13 | End: 2024-12-13

## 2024-12-13 RX ADMIN — KETOROLAC TROMETHAMINE 30 MILLIGRAM(S): 30 INJECTION INTRAMUSCULAR; INTRAVENOUS at 13:20

## 2024-12-13 RX ADMIN — KETOROLAC TROMETHAMINE 30 MILLIGRAM(S): 30 INJECTION INTRAMUSCULAR; INTRAVENOUS at 12:50

## 2024-12-13 NOTE — ED PROVIDER NOTE - PATIENT PORTAL LINK FT
You can access the FollowMyHealth Patient Portal offered by [facility] by registering at the following website: [email or web address]. By joining [facility] FollowMyHealth portal, you will also be able to view your health information using other applications (apps) compatible with our system.

## 2024-12-13 NOTE — ED PROVIDER NOTE - OBJECTIVE STATEMENT
64-year-old male history of liver cirrhosis presents to ED with left shoulder pain following a fall this morning.  As per patient he was carrying groceries and lost his footing.  Patient put his arms forward to brace his fall and fell on his left shoulder.  No head trauma, no LOC.  Patient was eventually able to get up with assistance at the scene.  Here in ED patient only complaining of pain to left shoulder

## 2024-12-13 NOTE — ED PROVIDER NOTE - CLINICAL SUMMARY MEDICAL DECISION MAKING FREE TEXT BOX
64-year-old male presents ED following a mechanical fall.  Patient with left shoulder/clavicle pain.  Concern for possible fracture versus location.  Will get x-ray, analgesia, reassess

## 2024-12-13 NOTE — ED ADULT NURSE NOTE - OBJECTIVE STATEMENT
65 yo male sitting on a chair c/o pain on the left shoulder s/p trip and fall this morning. Patient denies hitting his head; no LOC.

## 2024-12-13 NOTE — ED ADULT NURSE NOTE - NSFALLRISKINTERV_ED_ALL_ED

## 2024-12-13 NOTE — ED PROVIDER NOTE - NSFOLLOWUPINSTRUCTIONS_ED_ALL_ED_FT
Proximal Humerus Fracture    WHAT YOU NEED TO KNOW:    What is a proximal humerus fracture? A proximal humerus fracture is a crack or break in the top of your upper arm bone. The proximal humerus is one of the bones in your shoulder joint.  Proximal Humerus Fracture    What are the signs and symptoms of a proximal humerus fracture?    Pain when you move your arm    Swelling and bruising    Trouble moving your shoulder    Abnormal arm position or shape    Weakness or numbness in your arm, hand, or fingers  How is a proximal humerus fracture diagnosed? Your healthcare provider will ask about your injury and examine you. An x-ray may show the type of fracture you have. You may need more than 1 x-ray, or another x-ray after several days have passed.    How is an arm fracture treated? Treatment depends on the type of fracture you have. You may need any of the following:    A sling may be needed to hold your broken bones in place. It will decrease your arm movement and allow the bones to heal.  Shoulder Sling      Prescription pain medicine may be given. Ask your healthcare provider how to take this medicine safely. Some prescription pain medicines contain acetaminophen. Do not take other medicines that contain acetaminophen without talking to your healthcare provider. Too much acetaminophen may cause liver damage. Prescription pain medicine may cause constipation. Ask your healthcare provider how to prevent or treat constipation.    NSAIDs, such as ibuprofen, help decrease swelling, pain, and fever. This medicine is available with or without a doctor's order. NSAIDs can cause stomach bleeding or kidney problems in certain people. If you take blood thinner medicine, always ask your healthcare provider if NSAIDs are safe for you. Always read the medicine label and follow directions.    Acetaminophen decreases pain and fever. It is available without a doctor's order. Ask how much to take and how often to take it. Follow directions. Read the labels of all other medicines you are using to see if they also contain acetaminophen, or ask your doctor or pharmacist. Acetaminophen can cause liver damage if not taken correctly.    Closed reduction may be done to put your bones back into the correct position without surgery.    Surgery may be needed to put your bones back into the correct position. This is called open reduction. An incision is made and the bones are put back in the correct position. Wires, pins, plates, or screws may be used to hold the bones in place. For severe fractures, surgery to replace part of the shoulder joint with an artificial implant may be needed.  How can I manage my symptoms?    Rest your arm as much as possible. Ask your healthcare provider when you can move your arm. Also ask when you can return to sports or vigorous exercises.    Apply ice on your arm for 15 to 20 minutes every hour or as directed. Use an ice pack, or put crushed ice in a plastic bag. Cover it with a towel before you put it on your arm. Ice helps prevent tissue damage and decreases swelling and pain.    Go to physical therapy as directed. A physical therapist teaches you exercises to help improve movement and strength, and to decrease pain.  When should I seek immediate care?    Your pain does not get better or gets worse, even after you rest and take medicine.    Your arm, hand, or fingers feel numb.    The skin over your fracture is swollen, cold, or pale.    You cannot move your arm, hand, or fingers.  When should I call my doctor?    You have a fever.    Your sling gets wet, damaged, or falls off.    You have questions or concerns about your condition or care.  CARE AGREEMENT:    You have the right to help plan your care. Learn about your health condition and how it may be treated. Discuss treatment options with your healthcare providers to decide what care you want to receive. You always have the right to refuse treatment.    © [employer] 1973, 2024    	  back to top

## 2024-12-13 NOTE — ED PROVIDER NOTE - PROGRESS NOTE DETAILS
Proximal humerus fracture seen on x-ray.  Patient placed in arm sling.  Will DC with Ortho follow-up.

## 2025-06-12 ENCOUNTER — APPOINTMENT (OUTPATIENT)
Age: 65
End: 2025-06-12

## (undated) DEVICE — UNDERPAD LINEN SAVER 17 X 24"

## (undated) DEVICE — TUBING MEDI-VAC W MAXIGRIP CONNECTORS 1/4"X6'

## (undated) DEVICE — SOL IRR POUR NS 0.9% 500ML

## (undated) DEVICE — VENODYNE/SCD SLEEVE CALF MEDIUM

## (undated) DEVICE — DRSG 2X2

## (undated) DEVICE — SYR LUER LOK 3CC

## (undated) DEVICE — CATH IV SAFE BC 22G X 1" (BLUE)

## (undated) DEVICE — TUBING IV SET GRAVITY 1Y 78" MACRO

## (undated) DEVICE — SOLIDIFIER ISOLYZER 2000 CC

## (undated) DEVICE — WARMING BLANKET FULL ADULT

## (undated) DEVICE — PACK IV START WITH CHG

## (undated) DEVICE — BITE BLOCK ADULT 20 X 27MM (GREEN)

## (undated) DEVICE — BITE BLOCK MOUTHPCW/STRAP

## (undated) DEVICE — NDL HYPO SAFE 22G X 1" (BLACK)

## (undated) DEVICE — DENTURE CUP PINK

## (undated) DEVICE — DRSG CURITY GAUZE SPONGE 4 X 4" 12-PLY NON-STERILE

## (undated) DEVICE — FORMALIN CUPS 10% BUFFERED

## (undated) DEVICE — SALIVA EJECTOR (BLUE)

## (undated) DEVICE — SOL INJ NS 0.9% 500ML 1-PORT

## (undated) DEVICE — CONTAINER FORMALIN 10% 20ML

## (undated) DEVICE — MASK OXYGEN PANORAMIC

## (undated) DEVICE — ANESTHESIA CIRCUIT ADULT HMEF

## (undated) DEVICE — LABELS BLANK W PEN

## (undated) DEVICE — GOWN LG